# Patient Record
Sex: MALE | Race: WHITE | Employment: FULL TIME | URBAN - METROPOLITAN AREA
[De-identification: names, ages, dates, MRNs, and addresses within clinical notes are randomized per-mention and may not be internally consistent; named-entity substitution may affect disease eponyms.]

---

## 2020-12-29 ENCOUNTER — HOSPITAL ENCOUNTER (INPATIENT)
Age: 60
LOS: 5 days | Discharge: HOME | DRG: 177 | End: 2021-01-03
Attending: EMERGENCY MEDICINE | Admitting: INTERNAL MEDICINE
Payer: COMMERCIAL

## 2020-12-29 ENCOUNTER — APPOINTMENT (OUTPATIENT)
Dept: GENERAL RADIOLOGY | Age: 60
DRG: 177 | End: 2020-12-29
Payer: COMMERCIAL

## 2020-12-29 DIAGNOSIS — R06.09 DYSPNEA ON EXERTION: ICD-10-CM

## 2020-12-29 DIAGNOSIS — R09.02 HYPOXEMIA REQUIRING SUPPLEMENTAL OXYGEN: ICD-10-CM

## 2020-12-29 DIAGNOSIS — U07.1 COVID-19 VIRUS INFECTION: Primary | ICD-10-CM

## 2020-12-29 DIAGNOSIS — Z99.81 HYPOXEMIA REQUIRING SUPPLEMENTAL OXYGEN: ICD-10-CM

## 2020-12-29 DIAGNOSIS — J18.9 MULTIFOCAL PNEUMONIA: ICD-10-CM

## 2020-12-29 LAB
A/G RATIO: 1 (ref 1.1–2.2)
ABO/RH: NORMAL
ALBUMIN SERPL-MCNC: 3.5 G/DL (ref 3.4–5)
ALP BLD-CCNC: 45 U/L (ref 40–129)
ALT SERPL-CCNC: 27 U/L (ref 10–40)
ANION GAP SERPL CALCULATED.3IONS-SCNC: 12 MMOL/L (ref 3–16)
AST SERPL-CCNC: 42 U/L (ref 15–37)
BASE EXCESS VENOUS: -1.6 MMOL/L (ref -3–3)
BASOPHILS ABSOLUTE: 0 K/UL (ref 0–0.2)
BASOPHILS RELATIVE PERCENT: 0.3 %
BILIRUB SERPL-MCNC: 0.6 MG/DL (ref 0–1)
BUN BLDV-MCNC: 20 MG/DL (ref 7–20)
CALCIUM SERPL-MCNC: 8 MG/DL (ref 8.3–10.6)
CARBOXYHEMOGLOBIN: 2.1 % (ref 0–1.5)
CHLORIDE BLD-SCNC: 94 MMOL/L (ref 99–110)
CO2: 25 MMOL/L (ref 21–32)
CREAT SERPL-MCNC: 1.2 MG/DL (ref 0.8–1.3)
EKG ATRIAL RATE: 79 BPM
EKG DIAGNOSIS: NORMAL
EKG P AXIS: 29 DEGREES
EKG P-R INTERVAL: 138 MS
EKG Q-T INTERVAL: 376 MS
EKG QRS DURATION: 108 MS
EKG QTC CALCULATION (BAZETT): 431 MS
EKG R AXIS: 22 DEGREES
EKG T AXIS: -5 DEGREES
EKG VENTRICULAR RATE: 79 BPM
EOSINOPHILS ABSOLUTE: 0 K/UL (ref 0–0.6)
EOSINOPHILS RELATIVE PERCENT: 0 %
GFR AFRICAN AMERICAN: >60
GFR NON-AFRICAN AMERICAN: >60
GLOBULIN: 3.4 G/DL
GLUCOSE BLD-MCNC: 148 MG/DL (ref 70–99)
HCO3 VENOUS: 22.8 MMOL/L (ref 23–29)
HCT VFR BLD CALC: 41 % (ref 40.5–52.5)
HEMOGLOBIN: 13.8 G/DL (ref 13.5–17.5)
LYMPHOCYTES ABSOLUTE: 0.9 K/UL (ref 1–5.1)
LYMPHOCYTES RELATIVE PERCENT: 11 %
MCH RBC QN AUTO: 29.2 PG (ref 26–34)
MCHC RBC AUTO-ENTMCNC: 33.6 G/DL (ref 31–36)
MCV RBC AUTO: 87 FL (ref 80–100)
METHEMOGLOBIN VENOUS: 0.3 %
MONOCYTES ABSOLUTE: 0.5 K/UL (ref 0–1.3)
MONOCYTES RELATIVE PERCENT: 5.7 %
NEUTROPHILS ABSOLUTE: 6.6 K/UL (ref 1.7–7.7)
NEUTROPHILS RELATIVE PERCENT: 83 %
O2 CONTENT, VEN: 12 VOL %
O2 SAT, VEN: 58 %
O2 THERAPY: ABNORMAL
PCO2, VEN: 37.9 MMHG (ref 40–50)
PDW BLD-RTO: 14.4 % (ref 12.4–15.4)
PH VENOUS: 7.4 (ref 7.35–7.45)
PLATELET # BLD: 127 K/UL (ref 135–450)
PMV BLD AUTO: 9.4 FL (ref 5–10.5)
PO2, VEN: 29.8 MMHG (ref 25–40)
POTASSIUM SERPL-SCNC: 3.6 MMOL/L (ref 3.5–5.1)
PROCALCITONIN: 0.07 NG/ML (ref 0–0.15)
RAPID INFLUENZA  B AGN: NEGATIVE
RAPID INFLUENZA A AGN: NEGATIVE
RBC # BLD: 4.71 M/UL (ref 4.2–5.9)
SARS-COV-2, NAAT: DETECTED
SODIUM BLD-SCNC: 131 MMOL/L (ref 136–145)
TCO2 CALC VENOUS: 24 MMOL/L
TOTAL PROTEIN: 6.9 G/DL (ref 6.4–8.2)
TROPONIN: <0.01 NG/ML
WBC # BLD: 7.9 K/UL (ref 4–11)

## 2020-12-29 PROCEDURE — 80053 COMPREHEN METABOLIC PANEL: CPT

## 2020-12-29 PROCEDURE — 84484 ASSAY OF TROPONIN QUANT: CPT

## 2020-12-29 PROCEDURE — 85025 COMPLETE CBC W/AUTO DIFF WBC: CPT

## 2020-12-29 PROCEDURE — 87040 BLOOD CULTURE FOR BACTERIA: CPT

## 2020-12-29 PROCEDURE — 36415 COLL VENOUS BLD VENIPUNCTURE: CPT

## 2020-12-29 PROCEDURE — 93005 ELECTROCARDIOGRAM TRACING: CPT | Performed by: EMERGENCY MEDICINE

## 2020-12-29 PROCEDURE — U0002 COVID-19 LAB TEST NON-CDC: HCPCS

## 2020-12-29 PROCEDURE — 2580000003 HC RX 258: Performed by: NURSE PRACTITIONER

## 2020-12-29 PROCEDURE — 71045 X-RAY EXAM CHEST 1 VIEW: CPT

## 2020-12-29 PROCEDURE — 2580000003 HC RX 258: Performed by: INTERNAL MEDICINE

## 2020-12-29 PROCEDURE — 6360000002 HC RX W HCPCS: Performed by: NURSE PRACTITIONER

## 2020-12-29 PROCEDURE — 2500000003 HC RX 250 WO HCPCS: Performed by: INTERNAL MEDICINE

## 2020-12-29 PROCEDURE — 86901 BLOOD TYPING SEROLOGIC RH(D): CPT

## 2020-12-29 PROCEDURE — 6360000002 HC RX W HCPCS: Performed by: INTERNAL MEDICINE

## 2020-12-29 PROCEDURE — 84145 PROCALCITONIN (PCT): CPT

## 2020-12-29 PROCEDURE — 93010 ELECTROCARDIOGRAM REPORT: CPT | Performed by: INTERNAL MEDICINE

## 2020-12-29 PROCEDURE — 1200000000 HC SEMI PRIVATE

## 2020-12-29 PROCEDURE — 87804 INFLUENZA ASSAY W/OPTIC: CPT

## 2020-12-29 PROCEDURE — 86900 BLOOD TYPING SEROLOGIC ABO: CPT

## 2020-12-29 PROCEDURE — 99283 EMERGENCY DEPT VISIT LOW MDM: CPT

## 2020-12-29 PROCEDURE — 82803 BLOOD GASES ANY COMBINATION: CPT

## 2020-12-29 PROCEDURE — 6370000000 HC RX 637 (ALT 250 FOR IP): Performed by: INTERNAL MEDICINE

## 2020-12-29 RX ORDER — SODIUM CHLORIDE 9 MG/ML
INJECTION, SOLUTION INTRAVENOUS PRN
Status: DISCONTINUED | OUTPATIENT
Start: 2020-12-29 | End: 2021-01-03 | Stop reason: HOSPADM

## 2020-12-29 RX ORDER — IPRATROPIUM BROMIDE AND ALBUTEROL SULFATE 2.5; .5 MG/3ML; MG/3ML
1 SOLUTION RESPIRATORY (INHALATION)
Status: DISCONTINUED | OUTPATIENT
Start: 2020-12-29 | End: 2020-12-29

## 2020-12-29 RX ORDER — ACETAMINOPHEN 325 MG/1
650 TABLET ORAL EVERY 6 HOURS PRN
Status: DISCONTINUED | OUTPATIENT
Start: 2020-12-29 | End: 2021-01-03 | Stop reason: HOSPADM

## 2020-12-29 RX ORDER — 0.9 % SODIUM CHLORIDE 0.9 %
30 INTRAVENOUS SOLUTION INTRAVENOUS PRN
Status: DISCONTINUED | OUTPATIENT
Start: 2020-12-29 | End: 2021-01-03 | Stop reason: HOSPADM

## 2020-12-29 RX ORDER — ONDANSETRON 2 MG/ML
4 INJECTION INTRAMUSCULAR; INTRAVENOUS EVERY 6 HOURS PRN
Status: DISCONTINUED | OUTPATIENT
Start: 2020-12-29 | End: 2021-01-03 | Stop reason: HOSPADM

## 2020-12-29 RX ORDER — ATORVASTATIN CALCIUM 10 MG/1
20 TABLET, FILM COATED ORAL DAILY
Status: DISCONTINUED | OUTPATIENT
Start: 2020-12-29 | End: 2021-01-03 | Stop reason: HOSPADM

## 2020-12-29 RX ORDER — BENAZEPRIL HYDROCHLORIDE AND HYDROCHLOROTHIAZIDE 20; 12.5 MG/1; MG/1
1 TABLET ORAL DAILY
COMMUNITY

## 2020-12-29 RX ORDER — SODIUM CHLORIDE 0.9 % (FLUSH) 0.9 %
10 SYRINGE (ML) INJECTION EVERY 12 HOURS SCHEDULED
Status: DISCONTINUED | OUTPATIENT
Start: 2020-12-29 | End: 2021-01-03 | Stop reason: HOSPADM

## 2020-12-29 RX ORDER — METHYLPREDNISOLONE SODIUM SUCCINATE 40 MG/ML
40 INJECTION, POWDER, LYOPHILIZED, FOR SOLUTION INTRAMUSCULAR; INTRAVENOUS DAILY
Status: DISCONTINUED | OUTPATIENT
Start: 2020-12-29 | End: 2021-01-03 | Stop reason: HOSPADM

## 2020-12-29 RX ORDER — ATORVASTATIN CALCIUM 40 MG/1
40 TABLET, FILM COATED ORAL DAILY
COMMUNITY

## 2020-12-29 RX ORDER — PANTOPRAZOLE SODIUM 40 MG/1
40 TABLET, DELAYED RELEASE ORAL
Status: DISCONTINUED | OUTPATIENT
Start: 2020-12-30 | End: 2021-01-03 | Stop reason: HOSPADM

## 2020-12-29 RX ORDER — IPRATROPIUM BROMIDE AND ALBUTEROL SULFATE 2.5; .5 MG/3ML; MG/3ML
1 SOLUTION RESPIRATORY (INHALATION) EVERY 4 HOURS PRN
Status: DISCONTINUED | OUTPATIENT
Start: 2020-12-29 | End: 2021-01-03 | Stop reason: HOSPADM

## 2020-12-29 RX ORDER — POLYETHYLENE GLYCOL 3350 17 G/17G
17 POWDER, FOR SOLUTION ORAL DAILY PRN
Status: DISCONTINUED | OUTPATIENT
Start: 2020-12-29 | End: 2021-01-03 | Stop reason: HOSPADM

## 2020-12-29 RX ORDER — SODIUM CHLORIDE 0.9 % (FLUSH) 0.9 %
10 SYRINGE (ML) INJECTION PRN
Status: DISCONTINUED | OUTPATIENT
Start: 2020-12-29 | End: 2021-01-03 | Stop reason: HOSPADM

## 2020-12-29 RX ORDER — AMLODIPINE BESYLATE 5 MG/1
5 TABLET ORAL DAILY
COMMUNITY

## 2020-12-29 RX ORDER — MONTELUKAST SODIUM 10 MG/1
10 TABLET ORAL DAILY
COMMUNITY

## 2020-12-29 RX ORDER — ACETAMINOPHEN 650 MG/1
650 SUPPOSITORY RECTAL EVERY 6 HOURS PRN
Status: DISCONTINUED | OUTPATIENT
Start: 2020-12-29 | End: 2021-01-03 | Stop reason: HOSPADM

## 2020-12-29 RX ORDER — PROMETHAZINE HYDROCHLORIDE 25 MG/1
12.5 TABLET ORAL EVERY 6 HOURS PRN
Status: DISCONTINUED | OUTPATIENT
Start: 2020-12-29 | End: 2021-01-03 | Stop reason: HOSPADM

## 2020-12-29 RX ADMIN — REMDESIVIR 200 MG: 5 INJECTION INTRAVENOUS at 22:58

## 2020-12-29 RX ADMIN — AZITHROMYCIN MONOHYDRATE 500 MG: 500 INJECTION, POWDER, LYOPHILIZED, FOR SOLUTION INTRAVENOUS at 19:08

## 2020-12-29 RX ADMIN — ATORVASTATIN CALCIUM 20 MG: 10 TABLET, FILM COATED ORAL at 21:05

## 2020-12-29 RX ADMIN — SODIUM CHLORIDE, PRESERVATIVE FREE 10 ML: 5 INJECTION INTRAVENOUS at 22:59

## 2020-12-29 RX ADMIN — ENOXAPARIN SODIUM 40 MG: 40 INJECTION SUBCUTANEOUS at 22:59

## 2020-12-29 RX ADMIN — METHYLPREDNISOLONE SODIUM SUCCINATE 40 MG: 40 INJECTION, POWDER, FOR SOLUTION INTRAMUSCULAR; INTRAVENOUS at 21:04

## 2020-12-29 RX ADMIN — CEFTRIAXONE SODIUM 1 G: 1 INJECTION, POWDER, FOR SOLUTION INTRAMUSCULAR; INTRAVENOUS at 18:22

## 2020-12-29 ASSESSMENT — ENCOUNTER SYMPTOMS
DIARRHEA: 0
BACK PAIN: 0
WHEEZING: 0
ABDOMINAL PAIN: 0
VOMITING: 0
SHORTNESS OF BREATH: 0
COLOR CHANGE: 0
NAUSEA: 0
COUGH: 1

## 2020-12-29 NOTE — ED NOTES
Pt awake alert resp easy. Pt with COVID + and state that he has had SOB and increase WOB with activity. Pt wife has also been admitted with COVID. On assessment pt has coarse diminish breath sounds bilaterally through out. Pt color pale.       Devon Horton, PAUL  12/29/20 539 [facility], RN  12/29/20 4196

## 2020-12-29 NOTE — ED PROVIDER NOTES
Emergency Department Provider Note     Location: [address] [place] [address]  ED  12/29/2020    I independently performed a history and physical on [employer] . All diagnostic, treatment, and disposition decisions were made by myself in conjunction with the mid-level provider. Briefly, this is a 63 y.o. male here for shortness of breath. Patient was diagnosed with Covid on December 20. Patient reports that just over the last couple days he has become more and more short of breath. Found to be hypoxic on arrival..      ED Triage Vitals [12/29/20 1543]   BP Temp Temp Source Pulse Resp SpO2 Height Weight   121/65 98.4 °F (36.9 °C) Oral 81 18 (!) 87 % -- 230 lb (104.3 kg)        Patient resting comfortably in no acute distress. Heart is regular rate and rhythm. Lungs with crackles throughout bilaterally. Abdomen is soft, nondistended, and nontender. No peripheral edema noted. Patient seen and examined. Vital signs notable for hypoxia. Physical exam as documented above. Lab work-up notable for white blood cell count within normal limits, troponin negative, Covid positive. Chest x-ray consistent with multifocal infiltrates. Will admit patient for further work-up and management. EKG  The Ekg interpreted by me in the absence of a cardiologist shows. Normal sinus rhythm, rate 79, normal intervals, no STEMI      Xr Chest Portable    Result Date: 12/29/2020  EXAMINATION: ONE XRAY VIEW OF THE CHEST 12/29/2020 3:50 pm COMPARISON: 10/11/2007 HISTORY: ORDERING SYSTEM PROVIDED HISTORY: sob TECHNOLOGIST PROVIDED HISTORY: Reason for exam:->sob Reason for Exam: sob Acuity: Acute Type of Exam: Initial FINDINGS: Exam is limited by the patient's size. The heart is mildly enlarged for a portable exam.  Expiration results in vascular crowding. There patchy multifocal bilateral infiltrates greater on the right than the left. There is no evidence of pleural effusion.      Multifocal pulmonary infiltrates consistent with pneumonia         Results for orders placed or performed during the hospital encounter of 12/29/20   Rapid influenza A/B antigens    Specimen: Nasopharyngeal   Result Value Ref Range    Rapid Influenza A Ag Negative Negative    Rapid Influenza B Ag Negative Negative   CBC Auto Differential   Result Value Ref Range    WBC 7.9 4.0 - 11.0 K/uL    RBC 4.71 4.20 - 5.90 M/uL    Hemoglobin 13.8 13.5 - 17.5 g/dL    Hematocrit 41.0 40.5 - 52.5 %    MCV 87.0 80.0 - 100.0 fL    MCH 29.2 26.0 - 34.0 pg    MCHC 33.6 31.0 - 36.0 g/dL    RDW 14.4 12.4 - 15.4 %    Platelets 692 (L) 981 - 450 K/uL    MPV 9.4 5.0 - 10.5 fL    Neutrophils % 83.0 %    Lymphocytes % 11.0 %    Monocytes % 5.7 %    Eosinophils % 0.0 %    Basophils % 0.3 %    Neutrophils Absolute 6.6 1.7 - 7.7 K/uL    Lymphocytes Absolute 0.9 (L) 1.0 - 5.1 K/uL    Monocytes Absolute 0.5 0.0 - 1.3 K/uL    Eosinophils Absolute 0.0 0.0 - 0.6 K/uL    Basophils Absolute 0.0 0.0 - 0.2 K/uL   Comprehensive Metabolic Panel   Result Value Ref Range    Sodium 131 (L) 136 - 145 mmol/L    Potassium 3.6 3.5 - 5.1 mmol/L    Chloride 94 (L) 99 - 110 mmol/L    CO2 25 21 - 32 mmol/L    Anion Gap 12 3 - 16    Glucose 148 (H) 70 - 99 mg/dL    BUN 20 7 - 20 mg/dL    CREATININE 1.2 0.8 - 1.3 mg/dL    GFR Non-African American >60 >60    GFR African American >60 >60    Calcium 8.0 (L) 8.3 - 10.6 mg/dL    Total Protein 6.9 6.4 - 8.2 g/dL    Alb 3.5 3.4 - 5.0 g/dL    Albumin/Globulin Ratio 1.0 (L) 1.1 - 2.2    Total Bilirubin 0.6 0.0 - 1.0 mg/dL    Alkaline Phosphatase 45 40 - 129 U/L    ALT 27 10 - 40 U/L    AST 42 (H) 15 - 37 U/L    Globulin 3.4 g/dL   Troponin   Result Value Ref Range    Troponin <0.01 <0.01 ng/mL   Blood Gas, Venous   Result Value Ref Range    pH, Danny 7.397 7.350 - 7.450    pCO2, Danny 37.9 (L) 40.0 - 50.0 mmHg    pO2, Danny 29.8 25.0 - 40.0 mmHg    HCO3, Venous 22.8 (L) 23.0 - 29.0 mmol/L    Base Excess, Danny -1.6 -3.0 - 3.0 mmol/L    O2 Sat, Danny 58 Not Established

## 2020-12-29 NOTE — ED PROVIDER NOTES
 SHOULDER SURGERY  2005    left    SHOULDER SURGERY  11/16/11    VIDEO ARTHROSCOPY RIGHT SHOULDER, ROTATOR CUFF REPAIR,; SUBACROMIAL DECOMPRESSION    UPPER GASTROINTESTINAL ENDOSCOPY  02/22/2007    Esophagogastritis       Medications:  Previous Medications    IBUPROFEN (ADVIL;MOTRIN) 200 MG TABLET    Take 200 mg by mouth as needed. SILDENAFIL CITRATE (VIAGRA PO)    Take  by mouth as needed. SIMVASTATIN PO    Take 40 mg by mouth daily. Review of Systems:  Review of Systems   Constitutional: Negative for chills and fever. HENT: Positive for congestion. Respiratory: Positive for cough. Negative for shortness of breath and wheezing. Who presents emergency department with cough, chest congestion, shortness of breath since December 20, he states he tested positive for COVID-19 however, his increased shortness of breath is gotten worse over the past 2 days. Cardiovascular: Negative for chest pain. Gastrointestinal: Negative for abdominal pain, diarrhea, nausea and vomiting. Genitourinary: Negative for difficulty urinating, dysuria and frequency. Musculoskeletal: Negative for back pain. Skin: Negative for color change. Neurological: Negative for weakness, numbness and headaches. Positives and Pertinent negatives as per HPI. Except as noted above in the ROS, problem specific ROS was completed and is negative. Physical Exam:  Physical Exam  Vitals signs and nursing note reviewed. Constitutional:       Appearance: He is well-developed. He is not diaphoretic. HENT:      Head: Normocephalic. Right Ear: External ear normal.      Left Ear: External ear normal.   Eyes:      General: No scleral icterus. Right eye: No discharge. Left eye: No discharge. Neck:      Musculoskeletal: Normal range of motion and neck supple. Cardiovascular:      Rate and Rhythm: Normal rate.       Comments: Normal S1 and 2, peripheral pulses are 2+, no edema observed  Pulmonary:      Effort: Pulmonary effort is normal. No respiratory distress. Comments: Lungs are clear anteriorly, diminished posteriorly in the bases, initially patient saturations are 84 to 85% on room air, he was tachypneic and dyspneic upon ED arrival breathing approximately 30 breaths/min. However he was placed on nasal cannula oxygen saturations improved to 92% and respirations rate went down to 22. Abdominal:      Palpations: Abdomen is soft. Tenderness: There is no abdominal tenderness. Musculoskeletal: Normal range of motion. Skin:     General: Skin is warm. Capillary Refill: Capillary refill takes less than 2 seconds. Coloration: Skin is not pale. Neurological:      General: No focal deficit present. Mental Status: He is alert and oriented to person, place, and time. GCS: GCS eye subscore is 4. GCS verbal subscore is 5. GCS motor subscore is 6.    Psychiatric:         Behavior: Behavior normal.         MEDICAL DECISION MAKING    Vitals:    Vitals:    12/29/20 1645 12/29/20 1700 12/29/20 1715 12/29/20 1730   BP:  112/67  113/83   Pulse: 77 76 76 80   Resp: (!) 33 28 (!) 31 (!) 34   Temp:  98.4 °F (36.9 °C)  98.4 °F (36.9 °C)   TempSrc:       SpO2: 92% 94% 90% 93%   Weight:           LABS:  Labs Reviewed   CBC WITH AUTO DIFFERENTIAL - Abnormal; Notable for the following components:       Result Value    Platelets 266 (*)     Lymphocytes Absolute 0.9 (*)     All other components within normal limits    Narrative:     Performed at:  [address], [address] [address]   Phone [phone]   COMPREHENSIVE METABOLIC PANEL - Abnormal; Notable for the following components:    Sodium 131 (*)     Chloride 94 (*)     Glucose 148 (*)     Calcium 8.0 (*)     Albumin/Globulin Ratio 1.0 (*)     AST 42 (*)     All other components within normal limits    Narrative:     Performed at:  [facility] which included chart data review, documentation, medication ordering, reviewing the patient's old records, reevaluation patient's cardiac, pulmonary and neurological status. Reevaluation of vital signs. Consultations with ED attending and admitting physician. Ordering, interpreting reviewing diagnostic testing. Therefore a critical care time was 28 minutes of direct attention to the patient's condition did not include time spent on procedures. PROCEDURES:   Procedures    None    Patient was given:  Medications   cefTRIAXone (ROCEPHIN) 1 g IVPB in 50 mL D5W minibag (has no administration in time range)     And   azithromycin (ZITHROMAX) 500 mg in D5W 250ml addavial (has no administration in time range)     Patient presents with cough, shortness of breath, fatigue, lack of energy and not feeling well since December 20. He states he was seen at a clinic and tested positive for COVID-19 on the 20th however, today he became increased short of breath, chest tightness and not feeling well. He denies any significant pain on exam, rates the chest tightness a 3 out of 10. He initially patient saturations are 84 to 85% on room air, he was tachypneic and dyspneic upon ED arrival breathing approximately 30 breaths/min. However he was placed on nasal cannula oxygen saturations improved to 92% and respirations rate went down to 22. After evaluation and examination the patient IV access, blood work, chest x-ray and EKG were ordered. CBC shows no acute sepsis or anemia. Metabolic panel shows no acute electrolyte disturbances or renal insufficiency. Patient is COVID-19 positive. Her functions are essentially normal.  Troponin is negative. Blood gas shows no acute acidosis or alkalosis. However, bicarb is 22.8. Rapid flu is negative. Chest x-ray shows multifocal pneumonia infiltrates, I did cover him empirically with Rocephin and azithromycin. He was also ordered blood cultures.   EKG shows sinus rhythm rate of 79 bpm, no acute ST elevation, please see Dr. Billie Bardales EKG interpretation note. Upon reevaluation his breathing has improved since being on the oxygen however because of the requirement of supplemental oxygen, increased respiratory distress, dyspnea on exertion, COVID-19 and multifocal pneumonia I spoke with the attending about admitting the patient to hospital.  Hospitalist paged for admission. At 1756 I spoke with Dr. Anastacio Skelton, hospitalist on-call, we discussed the patient's case at length and she agreed to accept the patient for admission. The patient tolerated their visit well. I evaluated the patient. The physician was available for consultation as needed. The patient and / or the family were informed of the results of any tests, a time was given to answer questions, a plan was proposed and they agreed with plan. Therefore, patient will be admitted to hospital for further evaluation management of care. CLINICAL IMPRESSION:  1. COVID-19 virus infection    2. Dyspnea on exertion    3. Hypoxemia requiring supplemental oxygen    4. Multifocal pneumonia        DISPOSITION Decision To Admit 12/29/2020 05:20:33 PM      PATIENT REFERRED TO:  No follow-up provider specified.     DISCHARGE MEDICATIONS:  New Prescriptions    No medications on file       DISCONTINUED MEDICATIONS:  Discontinued Medications    No medications on file              (Please note the MDM and HPI sections of this note were completed with a voice recognition program.  Efforts were made to edit the dictations but occasionally words are mis-transcribed.)    Electronically signed, BETHANY Dickinson CNP,          BETHANY Dickinson CNP  12/29/20 3820

## 2020-12-29 NOTE — ED NOTES

## 2020-12-29 NOTE — ED NOTES

## 2020-12-29 NOTE — H&P
Start Date     Passive Exposure     Quit Date     Counseling Given     Comments             Family History:       Reviewed in detail and negative for DM, CAD, Cancer, CVA. Positive as follows:        Problem Relation Age of Onset    Heart Disease Father        REVIEW OF SYSTEMS:   Pertinent positives as noted in the HPI. All other systems reviewed and negative. PHYSICAL EXAM PERFORMED:    /83   Pulse 80   Temp 98.4 °F (36.9 °C)   Resp (!) 34   Wt 230 lb (104.3 kg)   SpO2 93%   BMI 33.00 kg/m²     General appearance:  , appears stated age and cooperative. On oxygen mildly tachypneic, obese  HEENT:  Normal cephalic, atraumatic without obvious deformity. Pupils equal, round, and reactive to light. Extra ocular muscles intact. Conjunctivae/corneas clear. Neck: Supple, with full range of motion. No jugular venous distention. Trachea midline. Respiratory:  Normal respiratory effort. bilaterally with Rales/no wheezes/Rhonchi. Cardiovascular:  Regular rate and rhythm with normal S1/S2 without murmurs, rubs or gallops. Abdomen: Soft, non-tender, non-distended with normal bowel sounds. Obese  Musculoskeletal:  No clubbing, cyanosis or edema bilaterally. Full range of motion without deformity. Skin: Skin color, texture, turgor normal.  No rashes or lesions. Neurologic:  Neurovascularly intact without any focal sensory/motor deficits. Cranial nerves: II-XII intact, grossly non-focal.  Psychiatric:  Alert and oriented, thought content appropriate, normal insight  Capillary Refill: Brisk,< 3 seconds   Peripheral Pulses: +2 palpable, equal bilaterally       Labs:     Recent Labs     12/29/20  1553   WBC 7.9   HGB 13.8   HCT 41.0   *     Recent Labs     12/29/20  1553   *   K 3.6   CL 94*   CO2 25   BUN 20   CREATININE 1.2   CALCIUM 8.0*     Recent Labs     12/29/20  1553   AST 42*   ALT 27   BILITOT 0.6   ALKPHOS 45     No results for input(s): INR in the last 72 hours.   Recent Labs 12/29/20  1553   TROPONINI <0.01       Urinalysis:    No results found for: Caretha Munda, BACTERIA, RBCUA, BLOODU, [address], GLUCOSEU    Radiology:     CXR: I have reviewed the CXR with the following interpretation:   EKG:  I have reviewed the EKG with the following interpretation:   Normal sinus rhythmNon-specific intra-ventricular conduction delayT wave abnormality, consider inferior ischemiaAbnormal ECGNo previous ECGs availableConfirmed by Linda Crawford MD, BLANCO     XR CHEST PORTABLE   Final Result   Multifocal pulmonary infiltrates consistent with pneumonia             ASSESSMENT:    Active Hospital Problems    Diagnosis Date Noted    COVID-19 [U07.1] 12/29/2020         PLAN:      COVID-19 positive test (U07.1, COVID-19) with Acute Pneumonia (J12.89, Other viral pneumonia)  Admit, droplet plus isolation, remdesivir, convalescent plasma monitor liver function test and renal function daily as long as he is on [address] [address] to dose remdesivir  Procalcitonin negative  Received Rocephin and Zithromax in the emergency room, not continuing the antibiotics at this time    Acute hypoxic respiratory failure-secondary to above-Solu-Medrol IV at consider pulmonology evaluation if necessary    Obesity    Mild hyponatremia-monitor    Mild thrombocytopenia-monitor            DVT Prophylaxis: Lovenox  Diet: Low-fat   code Status: Full code    PT/OT Eval Status:     Dispo -admitted to [place] [address] with telemetry       Norma Williamson MD    Thank you Miki Ann MD for the opportunity to be involved in this patient's care. If you have any questions or concerns please feel free to contact me at [phone].

## 2020-12-30 LAB
A/G RATIO: 0.9 (ref 1.1–2.2)
ALBUMIN SERPL-MCNC: 3.3 G/DL (ref 3.4–5)
ALP BLD-CCNC: 49 U/L (ref 40–129)
ALT SERPL-CCNC: 40 U/L (ref 10–40)
ANION GAP SERPL CALCULATED.3IONS-SCNC: 12 MMOL/L (ref 3–16)
AST SERPL-CCNC: 54 U/L (ref 15–37)
BASOPHILS ABSOLUTE: 0 K/UL (ref 0–0.2)
BASOPHILS RELATIVE PERCENT: 0.1 %
BILIRUB SERPL-MCNC: 0.4 MG/DL (ref 0–1)
BLOOD BANK DISPENSE STATUS: NORMAL
BLOOD BANK PRODUCT CODE: NORMAL
BPU ID: NORMAL
BUN BLDV-MCNC: 20 MG/DL (ref 7–20)
CALCIUM SERPL-MCNC: 8.3 MG/DL (ref 8.3–10.6)
CHLORIDE BLD-SCNC: 98 MMOL/L (ref 99–110)
CO2: 23 MMOL/L (ref 21–32)
CREAT SERPL-MCNC: 1.1 MG/DL (ref 0.8–1.3)
DESCRIPTION BLOOD BANK: NORMAL
EOSINOPHILS ABSOLUTE: 0 K/UL (ref 0–0.6)
EOSINOPHILS RELATIVE PERCENT: 0 %
GFR AFRICAN AMERICAN: >60
GFR NON-AFRICAN AMERICAN: >60
GLOBULIN: 3.5 G/DL
GLUCOSE BLD-MCNC: 185 MG/DL (ref 70–99)
HCT VFR BLD CALC: 41.2 % (ref 40.5–52.5)
HEMOGLOBIN: 14.2 G/DL (ref 13.5–17.5)
LYMPHOCYTES ABSOLUTE: 0.7 K/UL (ref 1–5.1)
LYMPHOCYTES RELATIVE PERCENT: 8.3 %
MCH RBC QN AUTO: 29.9 PG (ref 26–34)
MCHC RBC AUTO-ENTMCNC: 34.4 G/DL (ref 31–36)
MCV RBC AUTO: 86.9 FL (ref 80–100)
MONOCYTES ABSOLUTE: 0.3 K/UL (ref 0–1.3)
MONOCYTES RELATIVE PERCENT: 4.1 %
NEUTROPHILS ABSOLUTE: 7.3 K/UL (ref 1.7–7.7)
NEUTROPHILS RELATIVE PERCENT: 87.5 %
PDW BLD-RTO: 14.2 % (ref 12.4–15.4)
PLATELET # BLD: 125 K/UL (ref 135–450)
PMV BLD AUTO: 9.5 FL (ref 5–10.5)
POTASSIUM REFLEX MAGNESIUM: 3.9 MMOL/L (ref 3.5–5.1)
RBC # BLD: 4.74 M/UL (ref 4.2–5.9)
SODIUM BLD-SCNC: 133 MMOL/L (ref 136–145)
TOTAL PROTEIN: 6.8 G/DL (ref 6.4–8.2)
WBC # BLD: 8.3 K/UL (ref 4–11)

## 2020-12-30 PROCEDURE — 94761 N-INVAS EAR/PLS OXIMETRY MLT: CPT

## 2020-12-30 PROCEDURE — 1200000000 HC SEMI PRIVATE

## 2020-12-30 PROCEDURE — 6370000000 HC RX 637 (ALT 250 FOR IP): Performed by: INTERNAL MEDICINE

## 2020-12-30 PROCEDURE — 2500000003 HC RX 250 WO HCPCS: Performed by: INTERNAL MEDICINE

## 2020-12-30 PROCEDURE — 6360000002 HC RX W HCPCS: Performed by: INTERNAL MEDICINE

## 2020-12-30 PROCEDURE — 99223 1ST HOSP IP/OBS HIGH 75: CPT | Performed by: INTERNAL MEDICINE

## 2020-12-30 PROCEDURE — 2700000000 HC OXYGEN THERAPY PER DAY

## 2020-12-30 PROCEDURE — 6370000000 HC RX 637 (ALT 250 FOR IP)

## 2020-12-30 PROCEDURE — 2580000003 HC RX 258: Performed by: INTERNAL MEDICINE

## 2020-12-30 PROCEDURE — 85025 COMPLETE CBC W/AUTO DIFF WBC: CPT

## 2020-12-30 PROCEDURE — 80053 COMPREHEN METABOLIC PANEL: CPT

## 2020-12-30 RX ORDER — OMEPRAZOLE 10 MG/1
10 CAPSULE, DELAYED RELEASE ORAL DAILY
COMMUNITY
Start: 2020-09-18

## 2020-12-30 RX ADMIN — REMDESIVIR 100 MG: 5 INJECTION INTRAVENOUS at 21:36

## 2020-12-30 RX ADMIN — PANTOPRAZOLE SODIUM 40 MG: 40 TABLET, DELAYED RELEASE ORAL at 05:57

## 2020-12-30 RX ADMIN — ENOXAPARIN SODIUM 40 MG: 40 INJECTION SUBCUTANEOUS at 09:55

## 2020-12-30 RX ADMIN — SODIUM CHLORIDE, PRESERVATIVE FREE 10 ML: 5 INJECTION INTRAVENOUS at 09:56

## 2020-12-30 RX ADMIN — SODIUM CHLORIDE, PRESERVATIVE FREE 10 ML: 5 INJECTION INTRAVENOUS at 21:36

## 2020-12-30 RX ADMIN — METHYLPREDNISOLONE SODIUM SUCCINATE 40 MG: 40 INJECTION, POWDER, FOR SOLUTION INTRAMUSCULAR; INTRAVENOUS at 09:55

## 2020-12-30 RX ADMIN — ENOXAPARIN SODIUM 30 MG: 30 INJECTION SUBCUTANEOUS at 21:35

## 2020-12-30 RX ADMIN — Medication: at 06:14

## 2020-12-30 RX ADMIN — ATORVASTATIN CALCIUM 20 MG: 10 TABLET, FILM COATED ORAL at 09:55

## 2020-12-30 NOTE — CONSULTS
Elgin Marcano, [facility] 12/29/2020 20:03     REMDESIVIR LIVER/RENAL FUNCTION MONITORING         Recent Labs     12/29/20  1553   CREATININE 1.2      Est crcl~68         Recent Labs     12/29/20  1553   ALT 27   AST 42*         Dosing: Hepatic Impairment: Adult   Baseline hepatic impairment: There are no dosage adjustments provided in the manufacturers labeling (has not been studied). Hepatoxicity during therapy:  ALT >10 times the ULN: Consider remdesivir discontinuation. ALT elevation AND signs or symptoms of liver inflammation: Discontinue remdesivir.     Dosing: Renal Impairment: Adult  eGFR <30 mL/minute: No formal safety or pharmacokinetic data are available for patients with kidney impairment or who are receiving renal replacement therapies Joycelyn Gene 2020). s labeling does not recommend use; however, significant toxicity with a short duration of therapy (eg, 5 to 10 days) is unlikely (Adamsick 2020). Benefits may outweigh the risks in select patients (Adamsick 2020).

## 2020-12-30 NOTE — ACP (ADVANCE CARE PLANNING)
Advance Care Planning     General Advance Care Planning (ACP) Conversation    Date of Conversation: 12/29/2020  Conducted with: Son, Darwin Singh;  Pt unable to have conversation r/t increased O2 needs and spouse deferred to son because she was in the ED for Covid complications. Healthcare Decision Maker:      Primary Decision Maker: Eula Méndez - Spouse - [phone]    Secondary Decision Maker: Gino Méndez - Child - [phone]    Click here to complete [address] including selection of the Healthcare Decision Maker Relationship (ie \"Primary\")      Content/Action Overview:  Has NO ACP documents/care preferences - requested patient complete ACP documents  Reviewed DNR/DNI and patient elects Full Code (Attempt Resuscitation)  Patient's son stated that they have been talking to him regarding getting a living will and ACP documents.       Length of Voluntary ACP Conversation in minutes:  <16 minutes (Non-Billable)    Aretha Aceves

## 2020-12-30 NOTE — PROGRESS NOTES
Hospitalist Progress Note      PCP: Bernard Mcdowell DO    Date of Admission: 12/29/2020        Subjective:   Patient is complaining of shortness of breath , most significant with mild exertion. No cough, fevers, diarrhea. .. Has increased oxygen requirements overnight currently on 13 L high flow nasal cannula      Medications:  Reviewed    Infusion Medications    sodium chloride       Scheduled Medications    sodium chloride flush  10 mL Intravenous 2 times per day    enoxaparin  40 mg Subcutaneous Daily    methylPREDNISolone  40 mg Intravenous Daily    pantoprazole  40 mg Oral QAM AC    atorvastatin  20 mg Oral Daily    remdesivir IVPB  100 mg Intravenous Q24H     PRN Meds: sodium chloride flush, promethazine **OR** ondansetron, polyethylene glycol, acetaminophen **OR** acetaminophen, sodium chloride, sodium chloride, ipratropium-albuterol      Intake/Output Summary (Last 24 hours) at 12/30/2020 0834  Last data filed at 12/30/2020 0336  Gross per 24 hour   Intake 328.4 ml   Output --   Net 328.4 ml       Exam:    BP (!) 151/86   Pulse 77   Temp 97.8 °F (36.6 °C) (Oral)   Resp 20   Wt 230 lb (104.3 kg)   SpO2 92%   BMI 33.00 kg/m²     General appearance: No apparent distress, appears stated age and cooperative. HEENT: Pupils equal, round, and reactive to light. Conjunctivae/corneas clear. Neck: Supple, with full range of motion. No jugular venous distention. Trachea midline. Respiratory:  Normal respiratory effort. Clear to auscultation, bilaterally without Rales/Wheezes/Rhonchi. Cardiovascular: Regular rate and rhythm with normal S1/S2 without murmurs, rubs or gallops. Abdomen: Soft, non-tender, non-distended with normal bowel sounds. Musculoskeletal: No clubbing, cyanosis or edema bilaterally. Full range of motion without deformity. Skin: Skin color, texture, turgor normal.  No rashes or lesions. Neurologic:  Neurovascularly intact without any focal sensory/motor deficits.  Cranial nerves: II-XII intact, grossly non-focal.  Psychiatric: Alert and oriented, thought content appropriate, normal insight  Capillary Refill: Brisk,< 3 seconds   Peripheral Pulses: +2 palpable, equal bilaterally       Labs:   Recent Labs     12/29/20  1553 12/30/20  0550   WBC 7.9 8.3   HGB 13.8 14.2   HCT 41.0 41.2   * 125*     Recent Labs     12/29/20  1553 12/30/20  0550   * 133*   K 3.6 3.9   CL 94* 98*   CO2 25 23   BUN 20 20   CREATININE 1.2 1.1   CALCIUM 8.0* 8.3     Recent Labs     12/29/20  1553 12/30/20  0550   AST 42* 54*   ALT 27 40   BILITOT 0.6 0.4   ALKPHOS 45 49     No results for input(s): INR in the last 72 hours. Recent Labs     12/29/20  1553   TROPONINI <0.01       Assessment/Plan:    -COVID-19 pneumonia  -Acute hypoxic respiratory failure COVID-19-on 13 L HFNC  -Acute thrombocytopenia-mild-likely due to COVID-19  -Mild transaminitis likely due to COVID-19  -Mild hyponatremia  -Obesity with a BMI of 33    Plan  Patient received convalescent plasma.   Continue IV remdesivir and Solu-Medrol  Continue supplemental oxygen-currently on high flow nasal cannula wean as tolerated  Consult pulmonologist      DVT Prophylaxis: Lovenox  Diet: DIET CARDIAC;  Code Status: Full Code        Hugo Ambrosio MD

## 2020-12-30 NOTE — ED NOTES
Patient oxygen saturation was 86% on 2 L. Patient moved up to 4L nasal canula.       Jessie Todd RN  12/29/20 1190

## 2020-12-30 NOTE — PROGRESS NOTES
RESPIRATORY THERAPY ASSESSMENT    Name:  Johan Murray  Medical Record Number:  [id number]  Age: 63 y.o. Gender: male  : 1960  Today's Date:  2020  Room:      Assessment     Is the patient being admitted for a COPD or Asthma exacerbation? No   (If yes the patient will be seen every 4 hours for the first 24 hours and then reassessed)    Patient Admission Diagnosis      Allergies  Allergies   Allergen Reactions    Pcn [Penicillins] Hives     Rash         Minimum Predicted Vital Capacity:    N/A          Actual Vital Capacity:      N/A              Pulmonary History:No history  Home Oxygen Therapy:  room air  Home Respiratory Therapy:None   Current Respiratory Therapy:  Duoneb Q4 prn          Respiratory Severity Index(RSI)   Patients with orders for inhalation medications, oxygen, or any therapeutic treatment modality will be placed on Respiratory Protocol. They will be assessed with the first treatment and at least every 72 hours thereafter. The following severity scale will be used to determine frequency of treatment intervention.     Smoking History: No Smoking History = 0    Social History  Social History     Tobacco Use    Smoking status: Never Smoker   Substance Use Topics    Alcohol use: Not on file     Comment: weekly    Drug use: No       Recent Surgical History: None = 0  Past Surgical History  Past Surgical History:   Procedure Laterality Date    COLONOSCOPY  2007    Tubular adenoma    COLONOSCOPY      HAND SURGERY      ligament right hand    SHOULDER SURGERY  2005    left    SHOULDER SURGERY  11    VIDEO ARTHROSCOPY RIGHT SHOULDER, ROTATOR CUFF REPAIR,; SUBACROMIAL DECOMPRESSION    UPPER GASTROINTESTINAL ENDOSCOPY  2007    Esophagogastritis       Level of Consciousness: Alert, Oriented, and Cooperative = 0    Level of Activity: Walking unassisted = 0    Respiratory Pattern: Regular Pattern; RR 8-20 = 0    Breath Sounds: Diminshed bilaterally and/or crackles = 2    Sputum   ,  ,    Cough: Strong, spontaneous, non-productive = 0    Vital Signs   BP (!) 156/79   Pulse 70   Temp 98.4 °F (36.9 °C) (Oral)   Resp 18   Wt 230 lb (104.3 kg)   SpO2 98%   BMI 33.00 kg/m²   SPO2 (COPD values may differ): Greater than or equal to 92% on room air = 0    Peak Flow (asthma only): not applicable = 0    RSI: 0-4 = See once and convert to home regimen or discontinue        Plan       Goals: volume expansion    Patient/caregiver was educated on the proper method of use for Respiratory Care Devices:  No: N/A      Level of patient/caregiver understanding able to:   ? Verbalize understanding   ? Demonstrate understanding       ? Teach back        ? Needs reinforcement       ? No available caregiver               ? Other:     Response to education:  N/A     Is patient being placed on Home Treatment Regimen? Yes     Does the patient have everything they need prior to discharge? NA     Comments: Patient assessed    Plan of Care: Prn treatments    Electronically signed by Brian Caldwell RCP on 12/29/2020 at 8:17 PM    Respiratory Protocol Guidelines     1. Assessment and treatment by Respiratory Therapy will be initiated for medication and therapeutic interventions upon initiation of aerosolized medication. 2. Physician will be contacted for respiratory rate (RR) greater than 35 breaths per minute. Therapy will be held for heart rate (HR) greater than 140 beats per minute, pending direction from physician. 3. Bronchodilators will be administered via Metered Dose Inhaler (MDI) with spacer when the following criteria are met:  a. Alert and cooperative     b. HR < 140 bpm  c. RR < 30 bpm                d. Can demonstrate a 2-3 second inspiratory hold  4. Bronchodilators will be administered via Hand Held Nebulizer [facility]) to patients when ANY of the following criteria are met  a. Incognizant or uncooperative          b. Patients treated with HHN at Home        c.  Unable to demonstrate proper use of MDI with spacer     d. RR > 30 bpm   5. Bronchodilators will be delivered via Metered Dose Inhaler (MDI), HHN, Aerogen to intubated patients on mechanical ventilation. 6. Inhalation medication orders will be delivered and/or substituted as outlined below. Aerosolized Medications Ordering and Administration Guidelines:    1. All Medications will be ordered by a physician, and their frequency and/or modality will be adjusted as defined by the patients Respiratory Severity Index (RSI) score. 2. If the patient does not have documented COPD, consider discontinuing anticholinergics when RSI is less than 9.  3. If the bronchospasm worsens (increased RSI), then the bronchodilator frequency can be increased to a maximum of every 4 hours. If greater than every 4 hours is required, the physician will be contacted. 4. If the bronchospasm improves, the frequency of the bronchodilator can be decreased, based on the patient's RSI, but not less than home treatment regimen frequency. 5. Bronchodilator(s) will be discontinued if patient has a RSI less than 9 and has received no scheduled or as needed treatment for 72  Hrs. Patients Ordered on a Mucolytic Agent:    1. Must always be administered with a bronchodilator. 2. Discontinue if patient experiences worsened bronchospasm, or secretions have lessened to the point that the patient is able to clear them with a cough. Anti-inflammatory and Combination Medications:    1. If the patient lacks prior history of lung disease, is not using inhaled anti-inflammatory medication at home, and lacks wheezing by examination or by history for at least 24 hours, contact physician for possible discontinuation.

## 2020-12-30 NOTE — PROGRESS NOTES
4 Eyes Skin Assessment     The patient is being assess for   Admission    I agree that 2 RN's have performed a thorough Head to Toe Skin Assessment on the patient. ALL assessment sites listed below have been assessed. Areas assessed by both nurses:   [x]   Head, Face, and Ears   [x]   Shoulders, Back, and Chest, Abdomen  [x]   Arms, Elbows, and Hands   [x]   Coccyx, Sacrum, and Ischium  [x]   Legs, Feet, and Heels        *no skin issues    **SHARE this note so that the co-signing nurse is able to place an eSignature**    Co-signer eSignature: {Esignature:[id number]}    Does the Patient have Skin Breakdown?   No          [place]zar Prevention initiated:  No   Wound Care Orders initiated:  No      WOC nurse consulted for Pressure Injury (Stage 3,4, Unstageable, DTI, NWPT, Complex wounds)and New or Established Ostomies:  No      Primary Nurse eSignature: Electronically signed by Sean Gutierrez RN on 12/29/20 at 10:22 PM EST

## 2020-12-30 NOTE — CONSULTS
INPATIENT PULMONARY CRITICAL CARE CONSULT NOTE      Chief Complaint/Referring Provider:  Patient is being seen at the request of Dr. Andrey Feliciano for a consultation for COVID-19 PNA with acute hypoxic respiratory failure     Presenting HPI: Dmitry Pennington is a 76-year-old male with history of hypertension, hyperlipidemia, colon polyps. The patient presented to the ER with history of cough, shortness of breath, fatigue, lack of energy since 12/20/2020. Reportedly was Covid positive on 12/20 at an urgent care center, both he and his wife began with symptoms around the same time. To his knowledge there was no obvious exposure, his son did visit him 2 weeks ago. He does an executive job, works part-time in office, has not been visiting crowded places [employer]. The patient had fever, no loss of taste or smell. Highest temperature was 101 °F, he had extreme fatigue, body ache, extreme lack of energy with inability to walk across the room. He had a persistent cough, but over the last few days developed increased shortness of breath, chest tightness. His neighbor had Covid a few weeks ago, had a pulse oximeter that he gave to the patient. He noted that he was desaturating. He presented to the ER, was found to be hypoxemic, tachypneic and febrile. CXR showed bilateral infiltrates. He was given azithromycin and Rocephin for presumed community-acquired pneumonia, blood cultures was sent. He was then admitted for hypoxemic respiratory failure, multifocal pneumonia and COVID-19 virus infection. Since admission, the patient says that he is feeling slightly better, slightly increased energy levels, his appetite has improved. However his oxygen requirement has increased from 10 LPM to 13 LPM with SaO2 remaining in the low 90s. He has received steroid, remdesivir, 1 unit of convalescent plasma.        Patient Active Problem List    Diagnosis Date Noted    COVID-19 12/29/2020       Past Medical History: Diagnosis Date    Colonic polyp 2007    Hyperlipidemia 2007    Hypertension     Reflux 2007    Wears glasses     for reading        Past Surgical History:   Procedure Laterality Date    COLONOSCOPY  02/22/2007    Tubular adenoma    COLONOSCOPY      HAND SURGERY      ligament right hand    SHOULDER SURGERY  2005    left    SHOULDER SURGERY  11/16/11    VIDEO ARTHROSCOPY RIGHT SHOULDER, ROTATOR CUFF REPAIR,; SUBACROMIAL DECOMPRESSION    UPPER GASTROINTESTINAL ENDOSCOPY  02/22/2007    Esophagogastritis        Family History   Problem Relation Age of Onset    Heart Disease Father         Social History     Tobacco Use    Smoking status: Never Smoker    Smokeless tobacco: Never Used   Substance Use Topics    Alcohol use: Not on file     Comment: weekly        Allergies   Allergen Reactions    Pcn [Penicillins] Hives     Rash         Physical Exam:  Blood pressure 129/79, pulse 79, temperature 98.3 °F (36.8 °C), temperature source Oral, resp. rate 22, weight 230 lb (104.3 kg), SpO2 92 %.'   Constitutional: Very pleasant, overweight, slightly hesitant speech. Not in respiratory distress at present  HENT:  Oropharynx is clear and moist.  Class III airway, no obvious oral lesions eyes:  Conjunctivae are normal. Pupils equal, round, and reactive to light. No scleral icterus. Neck: Relatively short and large neck, no JVD. No tracheal deviation present. No obvious thyroid mass. Cardiovascular: Normal rate, regular rhythm, normal heart sounds. No right ventricular heave. No lower extremity edema. Pulmonary/Chest: No wheezes. No rales. No accessory muscle usage or stridor. Abdominal: Soft, protuberant, nontender. Bowel sounds present. Musculoskeletal: No cyanosis. No clubbing. No obvious joint deformity. Lymphadenopathy: No cervical or supraclavicular adenopathy. Skin: Skin is warm and dry. No rash or nodules on the exposed extremities. Psychiatric: Normal mood and affect.  Behavior is normal. No anxiety. Neurologic: Alert, awake and oriented. PERRL. Speech fluent. No obvious neurologic deficit, detailed exam not performed    Results:  CBC:   Recent Labs     12/29/20  1553 12/30/20  0550   WBC 7.9 8.3   HGB 13.8 14.2   HCT 41.0 41.2   MCV 87.0 86.9   * 125*     BMP:   Recent Labs     12/29/20  1553 12/30/20  0550   * 133*   K 3.6 3.9   CL 94* 98*   CO2 25 23   BUN 20 20   CREATININE 1.2 1.1     LIVER PROFILE:   Recent Labs     12/29/20  1553 12/30/20  0550   AST 42* 54*   ALT 27 40   BILITOT 0.6 0.4   ALKPHOS 45 49       Imaging:  I have reviewed radiology images personally. XR CHEST PORTABLE   Final Result   Multifocal pulmonary infiltrates consistent with pneumonia           Xr Chest Portable    Result Date: 12/29/2020  EXAMINATION: ONE XRAY VIEW OF THE CHEST 12/29/2020 3:50 pm COMPARISON: 10/11/2007 HISTORY: ORDERING SYSTEM PROVIDED HISTORY: sob TECHNOLOGIST PROVIDED HISTORY: Reason for exam:->sob Reason for Exam: sob Acuity: Acute Type of Exam: Initial FINDINGS: Exam is limited by the patient's size. The heart is mildly enlarged for a portable exam.  Expiration results in vascular crowding. There patchy multifocal bilateral infiltrates greater on the right than the left. There is no evidence of pleural effusion. Multifocal pulmonary infiltrates consistent with pneumonia     Echocardiogram: None in EMR  PFT: None in EMR        Assessment and plan:  Acute respiratory failure, hypoxemic. Adequate saturation on high flow nasal cannula oxygen. Discussed at length with the patient, it is possible that he may not worsen, but the potential for worsening needing intubation and mechanical ventilation was also discussed. He was assured we would not be doing this unless it was a lifesaving measure. Bilateral multifocal pneumonia due to COVID-19 infection. On Solu-Medrol  Acute COVID-19 infection. Remdesivir, convalescent plasma  Hyperglycemia. Probably due to steroid.   Elevated AST.  Monitor, likely due to COVID-19 infection  Thrombocytopenia. Monitor, likely due to COVID-19 infection  Essential hypertension. Per IM  DVT prophylaxis. Escalate dose of Lovenox  PUD prophylaxis. PPI    I have examined the patient, reviewed labs, images and medical records. My impression and recommendations are as above. Discussed with the patient and nursing.   We will follow with you, thank you for the consultation        Electronically signed by:  Kaley Ortiz MD    12/30/2020    4:17 PM.

## 2020-12-30 NOTE — PROGRESS NOTES
Gave patient IS and encouraged him to use to try to get off the O2. Also gave him tooth brush, toothpaste, mouthwash, and Deoderant. Told patient to get up as much as possible.

## 2020-12-30 NOTE — ED NOTES
This patient is on 6L nasal canula and at 87% oxygen saturation. RN contacted respiratory. Provider made aware.      Areli Tian RN  12/29/20 2123

## 2020-12-30 NOTE — CARE COORDINATION
CASE MANAGEMENT INITIAL ASSESSMENT      Reviewed chart and completed assessment via telephone with:son, Nancy Coleman  Explained Case Management role/services. Primary contact information:Gino Méndez, rodolfo. Primary contact, Eula, spouse, is currently in the ED for Covid related issues. Health Care Decision Maker :   Primary Decision Maker: Eula Méndez - Spouse - [phone]    Secondary Decision Maker: Malren Coyne - Child - [phone]          Can this person be reached and be able to respond quickly, such as within a few minutes or hours? Yes  Who would be your back-up decision maker? Name rodolfo Mclean  Phone Number:[phone]    Admit date/status:12/29/2020  Diagnosis:Covid-19   Is this a Readmission?:  No      Insurance:BCBS   Precert required for SNF: Yes       3 night stay required: No    Living arrangements, Adls, care needs, prior to admission:IPTA, lives w/spouse in a bilevel home w/7 steps to enter; 7 steps to upstairs and 6 steps downstairs; drives, works    [address] at home:    Services in the home and/or outpatient, prior to admission:none      PT/OT recs:not initiated at this time    [address] Notification (HEN):not initiated at this time    Barriers to discharge:none    Plan/comments:Pt a/o, able to ambulate, has PCP and insurance. Pt has no DCP needs at this time, but CM will continue to watch for needs, including possible new home O2.   Jen Resendiz RN         ECOC on chart for MD signature

## 2020-12-31 LAB
A/G RATIO: 0.9 (ref 1.1–2.2)
ALBUMIN SERPL-MCNC: 3.3 G/DL (ref 3.4–5)
ALP BLD-CCNC: 52 U/L (ref 40–129)
ALT SERPL-CCNC: 33 U/L (ref 10–40)
ANION GAP SERPL CALCULATED.3IONS-SCNC: 10 MMOL/L (ref 3–16)
AST SERPL-CCNC: 40 U/L (ref 15–37)
BILIRUB SERPL-MCNC: 0.6 MG/DL (ref 0–1)
BUN BLDV-MCNC: 29 MG/DL (ref 7–20)
CALCIUM SERPL-MCNC: 8.6 MG/DL (ref 8.3–10.6)
CHLORIDE BLD-SCNC: 96 MMOL/L (ref 99–110)
CO2: 23 MMOL/L (ref 21–32)
CREAT SERPL-MCNC: 0.9 MG/DL (ref 0.8–1.3)
D DIMER: 441 NG/ML DDU (ref 0–229)
FERRITIN: 1798 NG/ML (ref 30–400)
GFR AFRICAN AMERICAN: >60
GFR NON-AFRICAN AMERICAN: >60
GLOBULIN: 3.7 G/DL
GLUCOSE BLD-MCNC: 154 MG/DL (ref 70–99)
HCT VFR BLD CALC: 41 % (ref 40.5–52.5)
HEMOGLOBIN: 13.8 G/DL (ref 13.5–17.5)
MCH RBC QN AUTO: 29.2 PG (ref 26–34)
MCHC RBC AUTO-ENTMCNC: 33.7 G/DL (ref 31–36)
MCV RBC AUTO: 86.6 FL (ref 80–100)
PDW BLD-RTO: 14.1 % (ref 12.4–15.4)
PLATELET # BLD: 193 K/UL (ref 135–450)
PMV BLD AUTO: 9.3 FL (ref 5–10.5)
POTASSIUM SERPL-SCNC: 3.7 MMOL/L (ref 3.5–5.1)
RBC # BLD: 4.74 M/UL (ref 4.2–5.9)
SODIUM BLD-SCNC: 129 MMOL/L (ref 136–145)
TOTAL PROTEIN: 7 G/DL (ref 6.4–8.2)
WBC # BLD: 12.6 K/UL (ref 4–11)

## 2020-12-31 PROCEDURE — 94761 N-INVAS EAR/PLS OXIMETRY MLT: CPT

## 2020-12-31 PROCEDURE — 2700000000 HC OXYGEN THERAPY PER DAY

## 2020-12-31 PROCEDURE — 99232 SBSQ HOSP IP/OBS MODERATE 35: CPT | Performed by: INTERNAL MEDICINE

## 2020-12-31 PROCEDURE — 6360000002 HC RX W HCPCS: Performed by: INTERNAL MEDICINE

## 2020-12-31 PROCEDURE — 2580000003 HC RX 258: Performed by: INTERNAL MEDICINE

## 2020-12-31 PROCEDURE — 82728 ASSAY OF FERRITIN: CPT

## 2020-12-31 PROCEDURE — 85379 FIBRIN DEGRADATION QUANT: CPT

## 2020-12-31 PROCEDURE — XW13325 TRANSFUSION OF CONVALESCENT PLASMA (NONAUTOLOGOUS) INTO PERIPHERAL VEIN, PERCUTANEOUS APPROACH, NEW TECHNOLOGY GROUP 5: ICD-10-PCS | Performed by: INTERNAL MEDICINE

## 2020-12-31 PROCEDURE — 80053 COMPREHEN METABOLIC PANEL: CPT

## 2020-12-31 PROCEDURE — XW033E5 INTRODUCTION OF REMDESIVIR ANTI-INFECTIVE INTO PERIPHERAL VEIN, PERCUTANEOUS APPROACH, NEW TECHNOLOGY GROUP 5: ICD-10-PCS | Performed by: INTERNAL MEDICINE

## 2020-12-31 PROCEDURE — 2500000003 HC RX 250 WO HCPCS: Performed by: INTERNAL MEDICINE

## 2020-12-31 PROCEDURE — 36415 COLL VENOUS BLD VENIPUNCTURE: CPT

## 2020-12-31 PROCEDURE — 6370000000 HC RX 637 (ALT 250 FOR IP): Performed by: INTERNAL MEDICINE

## 2020-12-31 PROCEDURE — 1200000000 HC SEMI PRIVATE

## 2020-12-31 PROCEDURE — 85027 COMPLETE CBC AUTOMATED: CPT

## 2020-12-31 RX ADMIN — ENOXAPARIN SODIUM 30 MG: 30 INJECTION SUBCUTANEOUS at 21:52

## 2020-12-31 RX ADMIN — SODIUM CHLORIDE, PRESERVATIVE FREE 10 ML: 5 INJECTION INTRAVENOUS at 09:07

## 2020-12-31 RX ADMIN — REMDESIVIR 100 MG: 5 INJECTION INTRAVENOUS at 21:52

## 2020-12-31 RX ADMIN — ENOXAPARIN SODIUM 30 MG: 30 INJECTION SUBCUTANEOUS at 09:07

## 2020-12-31 RX ADMIN — ATORVASTATIN CALCIUM 20 MG: 10 TABLET, FILM COATED ORAL at 09:07

## 2020-12-31 RX ADMIN — PANTOPRAZOLE SODIUM 40 MG: 40 TABLET, DELAYED RELEASE ORAL at 06:34

## 2020-12-31 RX ADMIN — SODIUM CHLORIDE, PRESERVATIVE FREE 10 ML: 5 INJECTION INTRAVENOUS at 21:44

## 2020-12-31 RX ADMIN — METHYLPREDNISOLONE SODIUM SUCCINATE 40 MG: 40 INJECTION, POWDER, FOR SOLUTION INTRAMUSCULAR; INTRAVENOUS at 09:07

## 2020-12-31 NOTE — PROGRESS NOTES
Patient AxO. VSS. Patient utilizing 10.5L via High Flow Nasal Cannula with an oxygenation of 92%. Assessment completed as charted. Patient denies pain on 0-10 scale. Patient denies any other needs or requests at this time. Bed is in lowest locked position. Call light and bedside table is within reach. Will continue to monitor patient.

## 2020-12-31 NOTE — CARE COORDINATION
Pt a/o, able to ambulate, has PCP and insurance. Pt has no DCP needs at this time. CM will continue to watch for needs, including possible home O2.   Kathy Huston RN

## 2020-12-31 NOTE — PROGRESS NOTES
Hospitalist Progress Note      PCP: Bernard Mcdowell DO    Date of Admission: 12/29/2020        Subjective:     Says he feels better. Shortness of breath is improving. Appetite is good. No nausea or vomiting. No fevers    Medications:  Reviewed    Infusion Medications    sodium chloride       Scheduled Medications    enoxaparin  30 mg Subcutaneous BID    sodium chloride flush  10 mL Intravenous 2 times per day    methylPREDNISolone  40 mg Intravenous Daily    pantoprazole  40 mg Oral QAM AC    atorvastatin  20 mg Oral Daily    remdesivir IVPB  100 mg Intravenous Q24H     PRN Meds: sodium chloride flush, promethazine **OR** ondansetron, polyethylene glycol, acetaminophen **OR** acetaminophen, sodium chloride, sodium chloride, ipratropium-albuterol      Intake/Output Summary (Last 24 hours) at 12/31/2020 0943  Last data filed at 12/31/2020 0906  Gross per 24 hour   Intake 230 ml   Output --   Net 230 ml       Exam:    /72   Pulse 73   Temp 97.5 °F (36.4 °C) (Oral)   Resp 24   Wt 230 lb (104.3 kg)   SpO2 92%   BMI 33.00 kg/m²     General appearance: No apparent distress, appears stated age and cooperative. HEENT: Pupils equal, round, and reactive to light. Conjunctivae/corneas clear. Neck: Supple, with full range of motion. No jugular venous distention. Trachea midline. Respiratory:  Normal respiratory effort. Clear to auscultation, bilaterally without Rales/Wheezes/Rhonchi. Cardiovascular: Regular rate and rhythm with normal S1/S2 without murmurs, rubs or gallops. Abdomen: Soft, non-tender, non-distended with normal bowel sounds. Musculoskeletal: No clubbing, cyanosis or edema bilaterally. Full range of motion without deformity. Skin: Skin color, texture, turgor normal.  No rashes or lesions. Neurologic:  Neurovascularly intact without any focal sensory/motor deficits.  Cranial nerves: II-XII intact, grossly non-focal.  Psychiatric: Alert and oriented, thought content appropriate, normal insight  Capillary Refill: Brisk,< 3 seconds   Peripheral Pulses: +2 palpable, equal bilaterally       Labs:   Recent Labs     12/29/20  1553 12/30/20  0550   WBC 7.9 8.3   HGB 13.8 14.2   HCT 41.0 41.2   * 125*     Recent Labs     12/29/20  1553 12/30/20  0550   * 133*   K 3.6 3.9   CL 94* 98*   CO2 25 23   BUN 20 20   CREATININE 1.2 1.1   CALCIUM 8.0* 8.3     Recent Labs     12/29/20  1553 12/30/20  0550   AST 42* 54*   ALT 27 40   BILITOT 0.6 0.4   ALKPHOS 45 49     No results for input(s): INR in the last 72 hours. Recent Labs     12/29/20  1553   TROPONINI <0.01       Assessment/Plan:    -COVID-19 pneumonia  -Acute hypoxic respiratory failure COVID-19-on 13 L HFNC  -Acute thrombocytopenia-mild-likely due to COVID-19  -Mild transaminitis likely due to COVID-19  -Mild hyponatremia  -Obesity with a BMI of 33    Plan  Patient received convalescent plasma.   Continue IV remdesivir and Solu-Medrol  Continue supplemental oxygen-currently on high flow nasal cannula wean as tolerated        DVT Prophylaxis: Lovenox  Diet: DIET CARDIAC;  Code Status: Full Code        Henrietta Cervantes MD

## 2020-12-31 NOTE — ADT AUTH CERT
(LOVENOX) injection 30 mg 30 mg, Subcutaneous, 2 TIMES DAILY        12/29/20 2000    methylPREDNISolone sodium (SOLU-MEDROL) injection 40 mg 40 mg, Intravenous, DAILY     ==============================   Pulmonology   Consults   Date of Service:  12/30/2020  4:16 PM      INPATIENT PULMONARY CRITICAL CARE CONSULT NOTE           Chief Complaint/Referring Provider: Derrek Nava is being seen at the request of Dr. Justyna Feliciano for a consultation for COVID-19 PNA with acute hypoxic respiratory failure       Presenting HPI: Stephanie Neal is a 56-year-old male with history of hypertension, hyperlipidemia, colon polyps.  The patient presented to the ER with history of cough, shortness of breath, fatigue, lack of energy since 12/20/2020.  Reportedly was Covid positive on 12/20 at an urgent care center, both he and his wife began with symptoms around the same time.  To his knowledge there was no obvious exposure, his son did visit him 2 weeks ago. 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 [address] patient had fever, no loss of taste or smell.  Highest temperature was 101 °F, he had extreme fatigue, body ache, extreme lack of energy with inability to walk across the room. [facility] had a persistent cough, but over the last few days developed increased shortness of breath, chest tightness.  His neighbor had Covid a few weeks ago, had a pulse oximeter that he gave to the patient. [facility] noted that he was desaturating.  He presented to the ER, was found to be hypoxemic, tachypneic and febrile.   CXR showed bilateral infiltrates.  He was given azithromycin and Rocephin for presumed community-acquired pneumonia, blood cultures was sent. [facility] was then admitted for hypoxemic respiratory failure, multifocal pneumonia and COVID-19 virus infection.  Since admission, the patient says that he is feeling slightly better, slightly increased energy levels, his appetite has improved. [id number] his oxygen requirement has increased from 10 LPM to 13 LPM with SaO2 remaining in the low 90s.  He has received steroid, remdesivir, 1 unit of convalescent plasma. Physical Exam:   Blood pressure 129/79, pulse 79, temperature 98.3 °F (36.8 °C), temperature source Oral, resp.  rate 22, weight 230 lb (104.3 kg), SpO2 92 %.'       CBC:    Recent Labs     12/29/20   1553 12/30/20   0550   WBC 7.9 8.3   HGB 13.8 14.2   HCT 41.0 41.2   MCV 87.0 86.9   * 125*       BMP:    Recent Labs     12/29/20   1553 12/30/20   0550   * 133*   K 3.6 3.9   CL 94* 98*   CO2 25 23   BUN 20 20   CREATININE 1.2 1.1       LIVER PROFILE:    Recent Labs     12/29/20   1553 12/30/20   0550   AST 42* 54*   ALT 27 40   BILITOT 0.6 0.4   ALKPHOS 45 49           Imaging:   I have reviewed radiology images personally.       XR CHEST PORTABLE   Final Result   Multifocal pulmonary infiltrates consistent with pneumonia               Xr Chest Portable       Result Date: 12/29/2020   EXAMINATION: ONE XRAY VIEW OF THE CHEST 12/29/2020 3:50 pm COMPARISON: 10/11/2007 HISTORY: ORDERING SYSTEM PROVIDED HISTORY: sob TECHNOLOGIST PROVIDED HISTORY: Reason for exam:->sob Reason for Exam: sob Acuity: Acute Type of Exam: Initial FINDINGS: Exam is limited by the patient's size.  The heart is mildly enlarged for a portable exam.  Expiration results in vascular crowding.  There patchy multifocal bilateral infiltrates greater on the right than the left.  There is no evidence of pleural effusion.        Multifocal pulmonary infiltrates consistent with pneumonia        Echocardiogram: None in EMR   PFT: None in EMR               Assessment and plan:   Acute respiratory failure, hypoxemic.  Adequate saturation on high flow nasal cannula oxygen.  Discussed at length with the patient, it is possible that he may not worsen, but the potential for worsening needing intubation and mechanical ventilation was also discussed.  He was assured we would not be doing this unless it was a lifesaving measure. Bilateral multifocal pneumonia due to COVID-19 infection.  On Solu-Medrol   Acute COVID-19 infection.  Remdesivir, convalescent plasma   Hyperglycemia.  Probably due to steroid. Elevated AST.  Monitor, likely due to COVID-19 infection   Thrombocytopenia. Monitor, likely due to COVID-19 infection   Essential hypertension.  Per IM   DVT prophylaxis.  Escalate dose of Lovenox   PUD prophylaxis.  PPI          ===================================   Internal Medicine   Progress Notes   Signed   Date of Service:  12/30/2020  8:33 AM      Subjective:    Patient is complaining of shortness of breath , most significant with mild exertion.  No cough, fevers, diarrhea. ..  Has increased oxygen requirements overnight currently on 13 L high flow nasal cannula           BP (!) 151/86   Pulse 77   Temp 97.8 °F (36.6 °C) (Oral)   Resp 20   Wt 230 lb (104.3 kg)   SpO2 92%   BMI 33.00 kg/m²       Assessment/Plan:       -COVID-19 pneumonia   -Acute hypoxic respiratory failure COVID-19-on 13 L HFNC   -Acute thrombocytopenia-mild-likely due to COVID-19   -Mild transaminitis likely due to COVID-19   -Mild hyponatremia   -Obesity with a BMI of 33       Plan   Patient received convalescent plasma.  Continue IV remdesivir and Solu-Medrol   Continue supplemental oxygen-currently on high flow nasal cannula wean as tolerated   Consult pulmonologist           DVT Prophylaxis: Lovenox   Diet: DIET CARDIAC   ===============

## 2020-12-31 NOTE — PROGRESS NOTES
INPATIENT PULMONARY CRITICAL CARE PROGRESS NOTE      Reason for visit: Acute hypoxemic respiratory failure, multifocal Covid 19 pneumonia, thrombocytopenia, essential hypertension, hyperlipidemia, obesity    SUBJECTIVE: The patient remains afebrile and hemodynamically stable. He is on oxygen at 10.5 LPM with SaO2 92%. There has been no worsening in his oxygenation. He has been sitting up at the bedside, presently getting his blood drawn. He has been walking to the bathroom and pacing the room without increased shortness of breath. He has minimal nonproductive cough. His appetite is fair. He did have a bowel movement. The rest of his ROS was negative    Physical Exam:  Blood pressure 125/72, pulse 73, temperature 97.5 °F (36.4 °C), temperature source Oral, resp. rate 24, weight 230 lb (104.3 kg), SpO2 92 %.'   Discussed with the patient from the doorway  Due to the current efforts to prevent transmission of COVID-19 and also the need to preserve PPE for other caregivers, a face-to-face encounter with the patient was not performed. That being said, all relevant records and diagnostic tests were reviewed, including laboratory results and imaging. Please reference any relevant documentation elsewhere. Care will be coordinated with the primary service. .covid  Results:  CBC:   Recent Labs     12/29/20  1553 12/30/20  0550   WBC 7.9 8.3   HGB 13.8 14.2   HCT 41.0 41.2   MCV 87.0 86.9   * 125*     BMP:   Recent Labs     12/29/20  1553 12/30/20  0550   * 133*   K 3.6 3.9   CL 94* 98*   CO2 25 23   BUN 20 20   CREATININE 1.2 1.1     LIVER PROFILE:   Recent Labs     12/29/20  1553 12/30/20  0550   AST 42* 54*   ALT 27 40   BILITOT 0.6 0.4   ALKPHOS 45 49       Imaging:  I have reviewed radiology images personally.     XR CHEST PORTABLE   Final Result   Multifocal pulmonary infiltrates consistent with pneumonia           Xr Chest Portable    Result Date: 12/29/2020  EXAMINATION: ONE XRAY VIEW OF THE CHEST 12/29/2020 3:50 pm COMPARISON: 10/11/2007 HISTORY: ORDERING SYSTEM PROVIDED HISTORY: sob TECHNOLOGIST PROVIDED HISTORY: Reason for exam:->sob Reason for Exam: sob Acuity: Acute Type of Exam: Initial FINDINGS: Exam is limited by the patient's size. The heart is mildly enlarged for a portable exam.  Expiration results in vascular crowding. There patchy multifocal bilateral infiltrates greater on the right than the left. There is no evidence of pleural effusion. Multifocal pulmonary infiltrates consistent with pneumonia       Assessment and plan:  Acute respiratory failure, hypoxemic. Adequate saturation on high flow nasal cannula oxygen. Discussed at length with the patient, it is possible that he may not worsen, but the potential for worsening needing intubation and mechanical ventilation was also discussed. He was assured we would not be doing this unless it was a lifesaving measure. He remains stable for now, mildly symptomatic. Bilateral multifocal pneumonia due to COVID-19 infection. On Solu-Medrol  Acute COVID-19 infection. Remdesivir, convalescent plasma. Check ferritin, D-dimer, CRP  Hyperglycemia. Probably due to steroid, monitor. Elevated AST. Monitor, likely due to COVID-19 infection. Monitor  Thrombocytopenia. Monitor, likely due to COVID-19 infection  Essential hypertension. Per IM  DVT prophylaxis. Escalated dose of Lovenox  PUD prophylaxis.   PPI    Discussed with patient, nursing        Electronically signed by:  Vickie Moulton MD    12/31/2020    11:03 AM.

## 2021-01-01 PROCEDURE — 6370000000 HC RX 637 (ALT 250 FOR IP): Performed by: INTERNAL MEDICINE

## 2021-01-01 PROCEDURE — 99232 SBSQ HOSP IP/OBS MODERATE 35: CPT | Performed by: INTERNAL MEDICINE

## 2021-01-01 PROCEDURE — 6360000002 HC RX W HCPCS: Performed by: INTERNAL MEDICINE

## 2021-01-01 PROCEDURE — 2580000003 HC RX 258: Performed by: INTERNAL MEDICINE

## 2021-01-01 PROCEDURE — 1200000000 HC SEMI PRIVATE

## 2021-01-01 PROCEDURE — 2500000003 HC RX 250 WO HCPCS: Performed by: INTERNAL MEDICINE

## 2021-01-01 RX ADMIN — REMDESIVIR 100 MG: 5 INJECTION INTRAVENOUS at 21:33

## 2021-01-01 RX ADMIN — ATORVASTATIN CALCIUM 20 MG: 10 TABLET, FILM COATED ORAL at 08:16

## 2021-01-01 RX ADMIN — SODIUM CHLORIDE, PRESERVATIVE FREE 10 ML: 5 INJECTION INTRAVENOUS at 21:34

## 2021-01-01 RX ADMIN — METHYLPREDNISOLONE SODIUM SUCCINATE 40 MG: 40 INJECTION, POWDER, FOR SOLUTION INTRAMUSCULAR; INTRAVENOUS at 08:16

## 2021-01-01 RX ADMIN — ENOXAPARIN SODIUM 30 MG: 30 INJECTION SUBCUTANEOUS at 21:32

## 2021-01-01 RX ADMIN — ENOXAPARIN SODIUM 30 MG: 30 INJECTION SUBCUTANEOUS at 08:16

## 2021-01-01 RX ADMIN — SODIUM CHLORIDE, PRESERVATIVE FREE 10 ML: 5 INJECTION INTRAVENOUS at 08:17

## 2021-01-01 RX ADMIN — PANTOPRAZOLE SODIUM 40 MG: 40 TABLET, DELAYED RELEASE ORAL at 08:16

## 2021-01-01 NOTE — PROGRESS NOTES
Hospitalist Progress Note      PCP: Gordy Mcdonald DO    Date of Admission: 12/29/2020        Subjective:     Continues to feel better. No shortness of breath at the moment. No cough, fevers or chills. Appetite is good. Oxygen requirement is slowly improving    Medications:  Reviewed    Infusion Medications    sodium chloride       Scheduled Medications    enoxaparin  30 mg Subcutaneous BID    sodium chloride flush  10 mL Intravenous 2 times per day    methylPREDNISolone  40 mg Intravenous Daily    pantoprazole  40 mg Oral QAM AC    atorvastatin  20 mg Oral Daily    remdesivir IVPB  100 mg Intravenous Q24H     PRN Meds: sodium chloride flush, promethazine **OR** ondansetron, polyethylene glycol, acetaminophen **OR** acetaminophen, sodium chloride, sodium chloride, ipratropium-albuterol      Intake/Output Summary (Last 24 hours) at 1/1/2021 0859  Last data filed at 12/31/2020 1643  Gross per 24 hour   Intake 1470 ml   Output --   Net 1470 ml       Exam:    BP (!) 141/91   Pulse 75   Temp 97.6 °F (36.4 °C) (Oral)   Resp 16   Wt 230 lb (104.3 kg)   SpO2 93%   BMI 33.00 kg/m²     General appearance: No apparent distress, appears stated age and cooperative. HEENT: Pupils equal, round, and reactive to light. Conjunctivae/corneas clear. Neck: Supple, with full range of motion. No jugular venous distention. Trachea midline. Respiratory:  Normal respiratory effort. Clear to auscultation, bilaterally without Rales/Wheezes/Rhonchi. Cardiovascular: Regular rate and rhythm with normal S1/S2 without murmurs, rubs or gallops. Abdomen: Soft, non-tender, non-distended with normal bowel sounds. Musculoskeletal: No clubbing, cyanosis or edema bilaterally. Full range of motion without deformity. Skin: Skin color, texture, turgor normal.  No rashes or lesions. Neurologic:  Neurovascularly intact without any focal sensory/motor deficits.  Cranial nerves: II-XII intact, grossly non-focal.  Psychiatric: Alert and oriented, thought content appropriate, normal insight  Capillary Refill: Brisk,< 3 seconds   Peripheral Pulses: +2 palpable, equal bilaterally       Labs:   Recent Labs     12/29/20  1553 12/30/20  0550 12/31/20  1155   WBC 7.9 8.3 12.6*   HGB 13.8 14.2 13.8   HCT 41.0 41.2 41.0   * 125* 193     Recent Labs     12/29/20  1553 12/30/20  0550 12/31/20  1155   * 133* 129*   K 3.6 3.9 3.7   CL 94* 98* 96*   CO2 25 23 23   BUN 20 20 29*   CREATININE 1.2 1.1 0.9   CALCIUM 8.0* 8.3 8.6     Recent Labs     12/29/20  1553 12/30/20  0550 12/31/20  1155   AST 42* 54* 40*   ALT 27 40 33   BILITOT 0.6 0.4 0.6   ALKPHOS 45 49 52     No results for input(s): INR in the last 72 hours. Recent Labs     12/29/20  1553   TROPONINI <0.01       Assessment/Plan:    -COVID-19 pneumonia. . Status post convalescent plasma.   On IV remdesivir and Solu-Medrol  -Acute hypoxic respiratory failure COVID-19-O2 down to 9 L from peak of 13 L HFNC  -Acute thrombocytopenia-mild-likely due to COVID-19  -Mild transaminitis likely due to COVID-19  -Mild hyponatremia  -Obesity with a BMI of 33    Plan  Continue IV remdesivir and Solu-Medrol  Continue to wean O2 as tolerated      DVT Prophylaxis: Lovenox  Diet: DIET CARDIAC;  Code Status: Full Code        Hugo Ambrosio MD

## 2021-01-01 NOTE — PROGRESS NOTES
Perfect serve sent to MD Jodie:    \"Pt's med rec updated. Can you place orders for amlodipine, benazepril, and Montelukast? Thank you! \"

## 2021-01-01 NOTE — PROGRESS NOTES
INPATIENT PULMONARY CRITICAL CARE PROGRESS NOTE      Reason for visit: Acute hypoxemic respiratory failure, multifocal Covid 19 pneumonia, thrombocytopenia, essential hypertension, hyperlipidemia, obesity    SUBJECTIVE: The patient remains afebrile and hemodynamically stable. He is on oxygen at 9 LPM with SaO2 93%. He has been walking to the bathroom and pacing the room without increased shortness of breath. He has minimal nonproductive cough, is using his IS regularly. His appetite is fair, he slept well. He did have a bowel movement yesterday. The rest of his ROS was negative    Physical Exam:  Blood pressure (!) 141/91, pulse 75, temperature 97.6 °F (36.4 °C), temperature source Oral, resp. rate 16, weight 230 lb (104.3 kg), SpO2 93 %.'   Discussed with the patient from the doorway  Due to the current efforts to prevent transmission of COVID-19 and also the need to preserve PPE for other caregivers, a face-to-face encounter with the patient was not performed. That being said, all relevant records and diagnostic tests were reviewed, including laboratory results and imaging. Please reference any relevant documentation elsewhere. Care will be coordinated with the primary service. .covid  Results:  CBC:   Recent Labs     12/29/20  1553 12/30/20  0550 12/31/20  1155   WBC 7.9 8.3 12.6*   HGB 13.8 14.2 13.8   HCT 41.0 41.2 41.0   MCV 87.0 86.9 86.6   * 125* 193     BMP:   Recent Labs     12/29/20  1553 12/30/20  0550 12/31/20  1155   * 133* 129*   K 3.6 3.9 3.7   CL 94* 98* 96*   CO2 25 23 23   BUN 20 20 29*   CREATININE 1.2 1.1 0.9     LIVER PROFILE:   Recent Labs     12/29/20  1553 12/30/20  0550 12/31/20  1155   AST 42* 54* 40*   ALT 27 40 33   BILITOT 0.6 0.4 0.6   ALKPHOS 45 49 52       Imaging:  I have reviewed radiology images personally.     XR CHEST PORTABLE   Final Result   Multifocal pulmonary infiltrates consistent with pneumonia           Xr Chest Portable    Result Date: 12/29/2020  EXAMINATION: ONE XRAY VIEW OF THE CHEST 12/29/2020 3:50 pm COMPARISON: 10/11/2007 HISTORY: ORDERING SYSTEM PROVIDED HISTORY: sob TECHNOLOGIST PROVIDED HISTORY: Reason for exam:->sob Reason for Exam: sob Acuity: Acute Type of Exam: Initial FINDINGS: Exam is limited by the patient's size. The heart is mildly enlarged for a portable exam.  Expiration results in vascular crowding. There patchy multifocal bilateral infiltrates greater on the right than the left. There is no evidence of pleural effusion. Multifocal pulmonary infiltrates consistent with pneumonia       Assessment and plan:  Acute respiratory failure, hypoxemic. Adequate saturation on high flow nasal cannula oxygen. Slight improvement in oxygenation  He remains stable for now, mildly symptomatic. Bilateral multifocal pneumonia due to COVID-19 infection. On Solu-Medrol  Acute COVID-19 infection. Remdesivir, convalescent plasma. Marked elevation in ferritin, mild elevation d-dimer    Hyperglycemia. Probably due to steroid, monitor. Elevated AST. Imroving  Thrombocytopenia. Resolved  Essential hypertension. Per IM  Hyperglycemia. Probably due to steroid, monitor. DVT prophylaxis. Escalated dose of Lovenox  PUD prophylaxis. PPI    Discussed with patient.         Electronically signed by:  Alana James MD    1/1/2021    10:44 AM.

## 2021-01-02 LAB
A/G RATIO: 1 (ref 1.1–2.2)
ALBUMIN SERPL-MCNC: 3.2 G/DL (ref 3.4–5)
ALP BLD-CCNC: 49 U/L (ref 40–129)
ALT SERPL-CCNC: 33 U/L (ref 10–40)
ANION GAP SERPL CALCULATED.3IONS-SCNC: 12 MMOL/L (ref 3–16)
AST SERPL-CCNC: 29 U/L (ref 15–37)
BASOPHILS ABSOLUTE: 0 K/UL (ref 0–0.2)
BASOPHILS RELATIVE PERCENT: 0.1 %
BILIRUB SERPL-MCNC: 0.5 MG/DL (ref 0–1)
BLOOD CULTURE, ROUTINE: NORMAL
BUN BLDV-MCNC: 25 MG/DL (ref 7–20)
C-REACTIVE PROTEIN: 13.1 MG/L (ref 0–5.1)
CALCIUM SERPL-MCNC: 8.3 MG/DL (ref 8.3–10.6)
CHLORIDE BLD-SCNC: 104 MMOL/L (ref 99–110)
CO2: 24 MMOL/L (ref 21–32)
CREAT SERPL-MCNC: 0.9 MG/DL (ref 0.8–1.3)
CULTURE, BLOOD 2: NORMAL
D DIMER: 305 NG/ML DDU (ref 0–229)
EOSINOPHILS ABSOLUTE: 0 K/UL (ref 0–0.6)
EOSINOPHILS RELATIVE PERCENT: 0.1 %
GFR AFRICAN AMERICAN: >60
GFR NON-AFRICAN AMERICAN: >60
GLOBULIN: 3.2 G/DL
GLUCOSE BLD-MCNC: 103 MG/DL (ref 70–99)
HCT VFR BLD CALC: 40.5 % (ref 40.5–52.5)
HEMOGLOBIN: 13.4 G/DL (ref 13.5–17.5)
LACTATE DEHYDROGENASE: 383 U/L (ref 100–190)
LYMPHOCYTES ABSOLUTE: 1.4 K/UL (ref 1–5.1)
LYMPHOCYTES RELATIVE PERCENT: 11.2 %
MCH RBC QN AUTO: 29 PG (ref 26–34)
MCHC RBC AUTO-ENTMCNC: 33.2 G/DL (ref 31–36)
MCV RBC AUTO: 87.5 FL (ref 80–100)
MONOCYTES ABSOLUTE: 1.3 K/UL (ref 0–1.3)
MONOCYTES RELATIVE PERCENT: 10.4 %
NEUTROPHILS ABSOLUTE: 10 K/UL (ref 1.7–7.7)
NEUTROPHILS RELATIVE PERCENT: 78.2 %
PDW BLD-RTO: 14.7 % (ref 12.4–15.4)
PLATELET # BLD: 244 K/UL (ref 135–450)
PMV BLD AUTO: 9.1 FL (ref 5–10.5)
POTASSIUM SERPL-SCNC: 3.7 MMOL/L (ref 3.5–5.1)
RBC # BLD: 4.63 M/UL (ref 4.2–5.9)
SODIUM BLD-SCNC: 140 MMOL/L (ref 136–145)
TOTAL PROTEIN: 6.4 G/DL (ref 6.4–8.2)
WBC # BLD: 12.7 K/UL (ref 4–11)

## 2021-01-02 PROCEDURE — 6360000002 HC RX W HCPCS: Performed by: INTERNAL MEDICINE

## 2021-01-02 PROCEDURE — 94761 N-INVAS EAR/PLS OXIMETRY MLT: CPT

## 2021-01-02 PROCEDURE — 2500000003 HC RX 250 WO HCPCS: Performed by: INTERNAL MEDICINE

## 2021-01-02 PROCEDURE — 36415 COLL VENOUS BLD VENIPUNCTURE: CPT

## 2021-01-02 PROCEDURE — 86140 C-REACTIVE PROTEIN: CPT

## 2021-01-02 PROCEDURE — 2700000000 HC OXYGEN THERAPY PER DAY

## 2021-01-02 PROCEDURE — 6370000000 HC RX 637 (ALT 250 FOR IP): Performed by: INTERNAL MEDICINE

## 2021-01-02 PROCEDURE — 6370000000 HC RX 637 (ALT 250 FOR IP): Performed by: HOSPITALIST

## 2021-01-02 PROCEDURE — 99232 SBSQ HOSP IP/OBS MODERATE 35: CPT | Performed by: INTERNAL MEDICINE

## 2021-01-02 PROCEDURE — 83615 LACTATE (LD) (LDH) ENZYME: CPT

## 2021-01-02 PROCEDURE — 85379 FIBRIN DEGRADATION QUANT: CPT

## 2021-01-02 PROCEDURE — 80053 COMPREHEN METABOLIC PANEL: CPT

## 2021-01-02 PROCEDURE — 1200000000 HC SEMI PRIVATE

## 2021-01-02 PROCEDURE — 2580000003 HC RX 258: Performed by: INTERNAL MEDICINE

## 2021-01-02 PROCEDURE — 85025 COMPLETE CBC W/AUTO DIFF WBC: CPT

## 2021-01-02 RX ORDER — BENAZEPRIL HYDROCHLORIDE AND HYDROCHLOROTHIAZIDE 20; 12.5 MG/1; MG/1
1 TABLET ORAL DAILY
Status: DISCONTINUED | OUTPATIENT
Start: 2021-01-02 | End: 2021-01-02

## 2021-01-02 RX ORDER — HYDROCHLOROTHIAZIDE 12.5 MG/1
12.5 CAPSULE, GELATIN COATED ORAL DAILY
Status: DISCONTINUED | OUTPATIENT
Start: 2021-01-02 | End: 2021-01-03 | Stop reason: HOSPADM

## 2021-01-02 RX ORDER — MONTELUKAST SODIUM 10 MG/1
10 TABLET ORAL DAILY
Status: DISCONTINUED | OUTPATIENT
Start: 2021-01-02 | End: 2021-01-03 | Stop reason: HOSPADM

## 2021-01-02 RX ORDER — LISINOPRIL 20 MG/1
20 TABLET ORAL DAILY
Status: DISCONTINUED | OUTPATIENT
Start: 2021-01-02 | End: 2021-01-03 | Stop reason: HOSPADM

## 2021-01-02 RX ORDER — AMLODIPINE BESYLATE 5 MG/1
5 TABLET ORAL DAILY
Status: DISCONTINUED | OUTPATIENT
Start: 2021-01-02 | End: 2021-01-03 | Stop reason: HOSPADM

## 2021-01-02 RX ADMIN — ENOXAPARIN SODIUM 30 MG: 30 INJECTION SUBCUTANEOUS at 21:05

## 2021-01-02 RX ADMIN — PANTOPRAZOLE SODIUM 40 MG: 40 TABLET, DELAYED RELEASE ORAL at 05:57

## 2021-01-02 RX ADMIN — SODIUM CHLORIDE, PRESERVATIVE FREE 10 ML: 5 INJECTION INTRAVENOUS at 21:09

## 2021-01-02 RX ADMIN — REMDESIVIR 100 MG: 5 INJECTION INTRAVENOUS at 21:04

## 2021-01-02 RX ADMIN — AMLODIPINE BESYLATE 5 MG: 5 TABLET ORAL at 14:32

## 2021-01-02 RX ADMIN — SODIUM CHLORIDE, PRESERVATIVE FREE 10 ML: 5 INJECTION INTRAVENOUS at 08:45

## 2021-01-02 RX ADMIN — LISINOPRIL 20 MG: 20 TABLET ORAL at 14:32

## 2021-01-02 RX ADMIN — MONTELUKAST SODIUM 10 MG: 10 TABLET ORAL at 14:32

## 2021-01-02 RX ADMIN — HYDROCHLOROTHIAZIDE 12.5 MG: 12.5 CAPSULE ORAL at 14:32

## 2021-01-02 RX ADMIN — ATORVASTATIN CALCIUM 20 MG: 10 TABLET, FILM COATED ORAL at 08:43

## 2021-01-02 RX ADMIN — METHYLPREDNISOLONE SODIUM SUCCINATE 40 MG: 40 INJECTION, POWDER, FOR SOLUTION INTRAMUSCULAR; INTRAVENOUS at 08:43

## 2021-01-02 RX ADMIN — ENOXAPARIN SODIUM 30 MG: 30 INJECTION SUBCUTANEOUS at 08:44

## 2021-01-02 NOTE — PROGRESS NOTES
Hospitalist Progress Note      PCP: Louellen Moritz, DO    Date of Admission: 12/29/2020        Subjective:     Feels much better. . Shortness of breath is improved. Blaze Hernandez No chest pain. No diarrhea, nausea vomiting. No fevers    Medications:  Reviewed    Infusion Medications    sodium chloride       Scheduled Medications    enoxaparin  30 mg Subcutaneous BID    sodium chloride flush  10 mL Intravenous 2 times per day    methylPREDNISolone  40 mg Intravenous Daily    pantoprazole  40 mg Oral QAM AC    atorvastatin  20 mg Oral Daily    remdesivir IVPB  100 mg Intravenous Q24H     PRN Meds: sodium chloride flush, promethazine **OR** ondansetron, polyethylene glycol, acetaminophen **OR** acetaminophen, sodium chloride, sodium chloride, ipratropium-albuterol    No intake or output data in the 24 hours ending 01/02/21 1238    Exam:    BP (!) 145/91   Pulse 69   Temp 97.8 °F (36.6 °C) (Oral)   Resp 16   Ht 5' 10\" (1.778 m)   Wt 229 lb 15 oz (104.3 kg)   SpO2 94%   BMI 32.99 kg/m²     General appearance: No apparent distress, appears stated age and cooperative. HEENT: Pupils equal, round, and reactive to light. Conjunctivae/corneas clear. Neck: Supple, with full range of motion. No jugular venous distention. Trachea midline. Respiratory:  Normal respiratory effort. Clear to auscultation, bilaterally without Rales/Wheezes/Rhonchi. Cardiovascular: Regular rate and rhythm with normal S1/S2 without murmurs, rubs or gallops. Abdomen: Soft, non-tender, non-distended with normal bowel sounds. Musculoskeletal: No clubbing, cyanosis or edema bilaterally. Full range of motion without deformity. Skin: Skin color, texture, turgor normal.  No rashes or lesions. Neurologic:  Neurovascularly intact without any focal sensory/motor deficits.  Cranial nerves: II-XII intact, grossly non-focal.  Psychiatric: Alert and oriented, thought content appropriate, normal insight  Capillary Refill: Brisk,< 3 seconds Peripheral Pulses: +2 palpable, equal bilaterally       Labs:   Recent Labs     12/31/20  1155 01/02/21  0720   WBC 12.6* 12.7*   HGB 13.8 13.4*   HCT 41.0 40.5    244     Recent Labs     12/31/20  1155 01/02/21  0720   * 140   K 3.7 3.7   CL 96* 104   CO2 23 24   BUN 29* 25*   CREATININE 0.9 0.9   CALCIUM 8.6 8.3     Recent Labs     12/31/20  1155 01/02/21  0720   AST 40* 29   ALT 33 33   BILITOT 0.6 0.5   ALKPHOS 52 49     No results for input(s): INR in the last 72 hours. No results for input(s): Oanh Bernard in the last 72 hours. Assessment/Plan:    -COVID-19 pneumonia. . Status post convalescent plasma.   On IV remdesivir and Solu-Medrol  -Acute hypoxic respiratory failure COVID-19-O2 down to 4L from peak of 13 L HFNC  -Acute thrombocytopenia-mild-likely due to COVID-19  -Mild transaminitis likely due to COVID-19  -Mild hyponatremia-resolved  -Obesity with a BMI of 33    Plan  Continue IV remdesivir and Solu-Medrol  Continue to wean O2 as tolerated-  Anticipate discharge in 24-48 hours if he continues to improve    DVT Prophylaxis: Lovenox  Diet: DIET CARDIAC;  Code Status: Full Code        Víctor Bhatia MD

## 2021-01-02 NOTE — PROGRESS NOTES
Pt decided to take home dose of amlodipine and benazepril for HTN. Medications placed in lock box. Will continue to monitor.

## 2021-01-02 NOTE — PROGRESS NOTES
BMP:   Recent Labs     12/31/20  1155 01/02/21  0720   * 140   K 3.7 3.7   CL 96* 104   CO2 23 24   BUN 29* 25*   CREATININE 0.9 0.9     LIVER PROFILE:   Recent Labs     12/31/20  1155 01/02/21  0720   AST 40* 29   ALT 33 33   BILITOT 0.6 0.5   ALKPHOS 52 49       Imaging:  I have reviewed radiology images personally. XR CHEST PORTABLE   Final Result   Multifocal pulmonary infiltrates consistent with pneumonia           Results for Teto Lewis (MRN [id number]) as of 1/2/2021 10:31   Ref.  Range 12/30/2020 05:50 12/31/2020 11:55   Sodium Latest Ref Range: 136 - 145 mmol/L 133 (L) 129 (L)   Potassium Latest Ref Range: 3.5 - 5.1 mmol/L 3.9 3.7   Chloride Latest Ref Range: 99 - 110 mmol/L 98 (L) 96 (L)   CO2 Latest Ref Range: 21 - 32 mmol/L 23 23   BUN Latest Ref Range: 7 - 20 mg/dL 20 29 (H)   Creatinine Latest Ref Range: 0.8 - 1.3 mg/dL 1.1 0.9   Anion Gap Latest Ref Range: 3 - 16  12 10   GFR Non- Latest Ref Range: >60  >60 >60   GFR  Latest Ref Range: >60  >60 >60   Glucose Latest Ref Range: 70 - 99 mg/dL 185 (H) 154 (H)   Calcium Latest Ref Range: 8.3 - 10.6 mg/dL 8.3 8.6   Total Protein Latest Ref Range: 6.4 - 8.2 g/dL 6.8 7.0   Albumin Latest Ref Range: 3.4 - 5.0 g/dL 3.3 (L) 3.3 (L)   Globulin Latest Units: g/dL 3.5 3.7   Albumin/Globulin Ratio Latest Ref Range: 1.1 - 2.2  0.9 (L) 0.9 (L)   Alk Phos Latest Ref Range: 40 - 129 U/L 49 52   ALT Latest Ref Range: 10 - 40 U/L 40 33   AST Latest Ref Range: 15 - 37 U/L 54 (H) 40 (H)   Bilirubin Latest Ref Range: 0.0 - 1.0 mg/dL 0.4 0.6   WBC Latest Ref Range: 4.0 - 11.0 K/uL 8.3 12.6 (H)   RBC Latest Ref Range: 4.20 - 5.90 M/uL 4.74 4.74   Hemoglobin Quant Latest Ref Range: 13.5 - 17.5 g/dL 14.2 13.8   Hematocrit Latest Ref Range: 40.5 - 52.5 % 41.2 41.0   MCV Latest Ref Range: 80.0 - 100.0 fL 86.9 86.6   MCH Latest Ref Range: 26.0 - 34.0 pg 29.9 29.2   MCHC Latest Ref Range: 31.0 - 36.0 g/dL 34.4 33.7   MPV Latest Ref Range: 5.0 - 10.5 fL 9.5 9.3   RDW Latest Ref Range: 12.4 - 15.4 % 14.2 14.1   Platelet Count Latest Ref Range: 135 - 450 K/uL 125 (L) 193   Neutrophils % Latest Units: % 87.5      Results for Yessenia Spivey (MRN [id number]) as of 1/2/2021 10:31   Ref. Range 12/31/2020 11:55 1/2/2021 07:20   D-Dimer, Quant Latest Ref Range: 0 - 229 ng/mL  (H) 305 (H)       Assessment and plan:  Acute respiratory failure, hypoxemic. Oxygen supplementation to keep saturation between 90 to 94%  Please titrate down the oxygen as per the above parameters  Patient's oxygen requirements are coming down and patient was on 4 L of nasal cannula oxygen when evaluated with saturation of 94%. Bilateral multifocal pneumonia due to COVID-19 infection. On Solu-Medrol  Acute COVID-19 infection. Remdesivir, convalescent plasma. Marked elevation in ferritin, mild elevation d-dimer    Patient is on Lovenox twice a day  Hyperglycemia. Probably due to steroid, monitor. Improving  Elevated AST. Imroving  Thrombocytopenia. Resolved  Essential hypertension. Per IM  Hyperglycemia. Probably due to steroid, monitor. DVT prophylaxis. Escalated dose of Lovenox  PUD prophylaxis.   PPI    Patient's clinical status is improving    No other recommendations from pulmonary/critical care standpoint of view- will sign off-please call on whenever necessary basis        Electronically signed by:  Jean Monroe MD    1/2/2021    10:29 AM.

## 2021-01-03 VITALS
BODY MASS INDEX: 32.92 KG/M2 | SYSTOLIC BLOOD PRESSURE: 132 MMHG | DIASTOLIC BLOOD PRESSURE: 84 MMHG | RESPIRATION RATE: 16 BRPM | TEMPERATURE: 97.4 F | HEART RATE: 78 BPM | HEIGHT: 70 IN | OXYGEN SATURATION: 95 % | WEIGHT: 229.94 LBS

## 2021-01-03 LAB
A/G RATIO: 0.9 (ref 1.1–2.2)
ALBUMIN SERPL-MCNC: 3 G/DL (ref 3.4–5)
ALP BLD-CCNC: 48 U/L (ref 40–129)
ALT SERPL-CCNC: 37 U/L (ref 10–40)
ANION GAP SERPL CALCULATED.3IONS-SCNC: 12 MMOL/L (ref 3–16)
AST SERPL-CCNC: 29 U/L (ref 15–37)
BILIRUB SERPL-MCNC: 0.6 MG/DL (ref 0–1)
BUN BLDV-MCNC: 22 MG/DL (ref 7–20)
CALCIUM SERPL-MCNC: 8.6 MG/DL (ref 8.3–10.6)
CHLORIDE BLD-SCNC: 104 MMOL/L (ref 99–110)
CO2: 22 MMOL/L (ref 21–32)
CREAT SERPL-MCNC: 1 MG/DL (ref 0.8–1.3)
GFR AFRICAN AMERICAN: >60
GFR NON-AFRICAN AMERICAN: >60
GLOBULIN: 3.4 G/DL
GLUCOSE BLD-MCNC: 106 MG/DL (ref 70–99)
POTASSIUM SERPL-SCNC: 4 MMOL/L (ref 3.5–5.1)
SODIUM BLD-SCNC: 138 MMOL/L (ref 136–145)
TOTAL PROTEIN: 6.4 G/DL (ref 6.4–8.2)

## 2021-01-03 PROCEDURE — 80053 COMPREHEN METABOLIC PANEL: CPT

## 2021-01-03 PROCEDURE — 36415 COLL VENOUS BLD VENIPUNCTURE: CPT

## 2021-01-03 PROCEDURE — 6370000000 HC RX 637 (ALT 250 FOR IP): Performed by: INTERNAL MEDICINE

## 2021-01-03 PROCEDURE — 2700000000 HC OXYGEN THERAPY PER DAY

## 2021-01-03 PROCEDURE — 6360000002 HC RX W HCPCS: Performed by: INTERNAL MEDICINE

## 2021-01-03 PROCEDURE — 6370000000 HC RX 637 (ALT 250 FOR IP): Performed by: HOSPITALIST

## 2021-01-03 PROCEDURE — 94761 N-INVAS EAR/PLS OXIMETRY MLT: CPT

## 2021-01-03 PROCEDURE — 2580000003 HC RX 258: Performed by: INTERNAL MEDICINE

## 2021-01-03 RX ORDER — DEXAMETHASONE 6 MG/1
6 TABLET ORAL
Qty: 4 TABLET | Refills: 0 | Status: SHIPPED | OUTPATIENT
Start: 2021-01-04 | End: 2021-01-08

## 2021-01-03 RX ADMIN — MONTELUKAST SODIUM 10 MG: 10 TABLET ORAL at 09:27

## 2021-01-03 RX ADMIN — ENOXAPARIN SODIUM 30 MG: 30 INJECTION SUBCUTANEOUS at 09:28

## 2021-01-03 RX ADMIN — SODIUM CHLORIDE, PRESERVATIVE FREE 10 ML: 5 INJECTION INTRAVENOUS at 09:28

## 2021-01-03 RX ADMIN — METHYLPREDNISOLONE SODIUM SUCCINATE 40 MG: 40 INJECTION, POWDER, FOR SOLUTION INTRAMUSCULAR; INTRAVENOUS at 09:27

## 2021-01-03 RX ADMIN — AMLODIPINE BESYLATE 5 MG: 5 TABLET ORAL at 09:28

## 2021-01-03 RX ADMIN — HYDROCHLOROTHIAZIDE 12.5 MG: 12.5 CAPSULE ORAL at 09:27

## 2021-01-03 RX ADMIN — ATORVASTATIN CALCIUM 20 MG: 10 TABLET, FILM COATED ORAL at 09:28

## 2021-01-03 RX ADMIN — LISINOPRIL 20 MG: 20 TABLET ORAL at 09:28

## 2021-01-03 RX ADMIN — PANTOPRAZOLE SODIUM 40 MG: 40 TABLET, DELAYED RELEASE ORAL at 05:14

## 2021-01-03 NOTE — DISCHARGE SUMMARY
[address] Discharge Summary    Patient's PCP: Herber Watters DO  Admit Date: 12/29/2020   Discharge Date: 1/3/2021    Admitting Physician: Dr. Chris Burt MD  Discharge Physician: Dr. Annette Feliciano   Consults: pulmonary/intensive care    Brief HPI/hospital course:     56-year-old male with history of essential hypertension, diagnosed with COVID-19 on December 20 presented to the hospital on December 29 with shortness of breath, cough. . Chest x-ray showed multifocal infiltrates consistent with pneumonia. ... Patient received 1 unit of convalescent plasma and was started on IV remdesivir and Solu-Medrol. Juan J Angry He was hypoxic requiring up to 15 L high flow nasal cannula but was gradually weaned down to room air over several days. ... Prior to discharge, he had a 6-minute walk test and was found to require 2 L of oxygen with ambulation. .. Patient clinically improved prior to discharge with resolution of his symptoms    Invasive procedures:  none    Discharge Diagnoses:     -COVID-19 pneumonia. . Status post convalescent plasma. On IV remdesivir and Solu-Medrol  -Acute hypoxic respiratory failure COVID-  -Acute thrombocytopenia-mild-likely due to COVID-19  -Mild transaminitis likely due to COVID-19  -Mild hyponatremia-resolved  -Obesity with a BMI of 33  -Essential hypertension      Physical Exam: /84   Pulse 78   Temp 97.4 °F (36.3 °C) (Oral)   Resp 16   Ht 5' 10\" (1.778 m)   Wt 229 lb 15 oz (104.3 kg)   SpO2 95%   BMI 32.99 kg/m²   Gen/overall appearance: Not in acute distress. Alert. Head: Normocephalic, atraumatic  Eyes: EOMI, good acuity  ENT:- Oral mucosa moist  Neck: No JVD, thyromegaly  CVS: Nml S1S2, no MRG, RRR  Pulm: Clear bilaterally. No crackles/wheezes  Gastrointestinal: Soft, NT/ND, +BS  Musculoskeletal: No edema. Warm  Neuro: No focal deficit. Moves extremity spontaneously. Psychiatry: Appropriate affect. Not agitated. Skin: Warm, dry with normal turgor.  No rash        Significant Diagnostic Studies:    chest x-ray   Results: see above        Treatments: As above. Discharge Medications:     Medication List      START taking these medications    dexamethasone 6 MG tablet  Commonly known as: Decadron  Take 1 tablet by mouth daily (with breakfast) for 4 days  Start taking on: January 4, 2021        Theo Yancey taking these medications    amLODIPine 5 MG tablet  Commonly known as: NORVASC     atorvastatin 40 MG tablet  Commonly known as: LIPITOR     benazepril-hydrochlorthiazide 20-12.5 MG per tablet  Commonly known as: LOTENSIN HCT     ibuprofen 200 MG tablet  Commonly known as: ADVIL;MOTRIN     montelukast 10 MG tablet  Commonly known as: SINGULAIR     omeprazole 10 MG delayed release capsule  Commonly known as: PRILOSEC     VIAGRA PO           Where to Get Your Medications      These medications were sent to [facility]/pharmacy [place] [address], [address]    Phone: [phone]   · dexamethasone 6 MG tablet         Activity: activity as tolerated  Diet: DIET CARDIAC; Disposition: home  Discharged Condition: Stable  Follow Up:   Bernard Mcdowell DO  [address] [facility]. [address] Armas  [phone]    Schedule an appointment as soon as possible for a visit          Code status:  Full Code         Total time spent on discharge, finalizing medications, referrals and arranging outpatient follow up was more than 45 minutes      Thank you Dr. Bernard Mcdowell DO for the opportunity to be involved in this patients care.

## 2021-01-03 NOTE — PROGRESS NOTES
Oxygen documentation:      1. O2 saturation at REST on ROOM AIR = _92_____%      f saturation is 89% or above please proceed with steps 2 and 3.      2. O2 saturation with AMBULATION of __20___ feet on ROOM AIR = __84___%  3.  O2 saturation with AMBULATION on current liter flow =2L ___92___%      DCP notified: ______  Rita Zuñiga

## 2021-01-04 ENCOUNTER — CARE COORDINATION (OUTPATIENT)
Dept: CASE MANAGEMENT | Age: 61
End: 2021-01-04

## 2021-01-04 NOTE — CARE COORDINATION
[facility] Transitions Initial Follow Up Call    Call within 2 business days of discharge: Yes    Patient: Henrietta Mendez Patient : 1960   MRN: [id number]  Reason for Admission: Covid call  Discharge Date: 1/3/21 RARS: Readmission Risk Score: 10      Last Discharge [facility]       Complaint Diagnosis Description Type Department Provider    20 Shortness of Breath; Fatigue; Cough COVID-19 virus infection . .. ED to Hosp-Admission (Discharged) (ADMITTED) [facility]5 Danita Holter, MD; William FIELD Sep... Attempted to reach patient via phone for initial post hospital transition call. Mailbox is full and unable to leave message  Chris Liu RN   Care Transition Nurse  [phone]    Care Transitions 24 Hour Call    Care Transitions Interventions         Follow Up  No future appointments.     Chris Liu RN

## 2021-01-05 ENCOUNTER — CARE COORDINATION (OUTPATIENT)
Dept: CASE MANAGEMENT | Age: 61
End: 2021-01-05

## 2021-01-05 NOTE — ADT AUTH CERT
Pneumonia - Care Day 5 (1/2/2021) by Willard Fenton RN       Review Status Review Entered   Completed 1/5/2021 10:36      Criteria Review      Care Day: 5 Care Date: 1/2/2021 Level of Care: Inpatient Floor    Guideline Day 3    Clinical Status    (X) * Hemodynamic stability    1/5/2021 10:36 AM EST by Noemargot Garcia      CRP 13.1    bun 25    (X) * Afebrile or temperature acceptable for next level of care    1/5/2021 10:36 AM EST by Noemargot PedrazaJose      97.8    (X) * Tachypnea absent    1/5/2021 10:36 AM EST by Noe Garcia      resps  58    ( ) * Hypoxemia absent    1/5/2021 10:36 AM EST by Noemargot Garcia      91-94% on 4 liter nc    (X) * Mental status at baseline    ( ) * Antibiotic regimen acceptable for next level of care    ( ) * Discharge plans and education understood    Activity    ( ) * Ambulatory or acceptable for next level of care    Routes    (X) * Oral hydration, medications, and diet    1/5/2021 10:36 AM EST by Noe Garcia      remdesivir 100mg iv given, lovenox 30mg sc bid  solumedrol 40mg iv qd  lisinopril 20mg qd and norvasc 5mg qd is ordered  singulair 10mg qd is ordered  microzide 12.5mg qd is ordered    Interventions    ( ) * Oxygen absent or at baseline need    1/5/2021 10:36 AM EST by Noe Garcia      weaning as able    ( ) * Isolation not indicated, or is performable at next level of care    1/5/2021 10:36 AM EST by Noemargot Garcia      droplet plus isolation    (X) WBC    1/5/2021 10:36 AM EST by Noemargot PedrazaJose      12.7    * Milestone   Additional Notes   1/2/21   Per Pulm:   Patient's oxygen requirements are coming down and patient was on 4 L of nasal cannula oxygen with saturation 94% when evaluated, patient has been afebrile and hemodynamically maintained, patient has normal sinus rhythm on the monitor, patient does not have any increasing symptoms of concern, patient's blood sugars are improving,      Acute respiratory failure, hypoxemic.    Oxygen supplementation to keep saturation between 90 to 94%   Please titrate down the oxygen as per the above parameters   Patient's oxygen requirements are coming down and patient was on 4 L of nasal cannula oxygen when evaluated with saturation of 94%. Bilateral multifocal pneumonia due to COVID-19 infection.  On Solu-Medrol   Acute COVID-19 infection.  Remdesivir, convalescent plasma. Marked elevation in ferritin, mild elevation d-dimer     Patient is on Lovenox twice a day   Hyperglycemia.  Probably due to steroid, monitor.  Improving   Elevated AST.  Imroving   Thrombocytopenia. Resolved   Essential hypertension.  Per IM   Hyperglycemia. Probably due to steroid, monitor.     DVT prophylaxis.  Escalated dose of Lovenox   PUD prophylaxis.  PPI       Patient's clinical status is improving      Pneumonia - Care Day 3 (12/31/2020) by Rochelle Castaneda RN       Review Status Review Entered   Completed 1/5/2021 10:36      Criteria Review      Care Day: 3 Care Date: 12/31/2020 Level of Care: Inpatient Floor    Guideline Day 3    Clinical Status    ( ) * Hemodynamic stability    1/5/2021 10:36 AM EST by Cece Nix      83  143/82    Na 129  CO2  23  bun  29   wbc  12.6    (X) * Afebrile or temperature acceptable for next level of care    1/5/2021 10:36 AM EST by Cece Nix      97.6    ( ) * Tachypnea absent    1/5/2021 10:36 AM EST by Cece Nix      resps 24   91% on 10.5 liter high flow nasal cannula O2    ( ) * Hypoxemia absent    ( ) * Mental status at baseline    ( ) * Antibiotic regimen acceptable for next level of care    ( ) * Discharge plans and education understood    Activity    ( ) * Ambulatory or acceptable for next level of care    Routes    (X) * Oral hydration, medications, and diet    1/5/2021 10:36 AM EST by Cece Nix      lovenox 30mg sc bid  solumedrol 40mg iv qd  remdesivir 100mg iv given    Interventions    ( ) * Oxygen absent or at baseline need    1/5/2021 10:36 AM EST by Cece Nix      requiring high flow O2    ( ) *